# Patient Record
Sex: FEMALE | Race: WHITE | Employment: UNEMPLOYED | URBAN - METROPOLITAN AREA
[De-identification: names, ages, dates, MRNs, and addresses within clinical notes are randomized per-mention and may not be internally consistent; named-entity substitution may affect disease eponyms.]

---

## 2019-03-25 ENCOUNTER — HOSPITAL ENCOUNTER (OUTPATIENT)
Dept: SURGERY | Age: 49
Discharge: HOME | DRG: 455 | End: 2019-03-25
Payer: COMMERCIAL

## 2019-03-25 VITALS
TEMPERATURE: 97.9 F | OXYGEN SATURATION: 95 % | BODY MASS INDEX: 27.67 KG/M2 | RESPIRATION RATE: 16 BRPM | WEIGHT: 162.06 LBS | HEART RATE: 84 BPM | DIASTOLIC BLOOD PRESSURE: 73 MMHG | SYSTOLIC BLOOD PRESSURE: 136 MMHG | HEIGHT: 64 IN

## 2019-03-25 LAB
ABO + RH BLD: NORMAL
ANION GAP SERPL CALC-SCNC: 5 MMOL/L (ref 7–16)
APPEARANCE UR: ABNORMAL
BACTERIA SPEC CULT: NORMAL
BACTERIA URNS QL MICRO: ABNORMAL /HPF
BASOPHILS # BLD: 0.1 K/UL (ref 0–0.2)
BASOPHILS NFR BLD: 0 % (ref 0–2)
BILIRUB UR QL: NEGATIVE
BLOOD BANK CMNT PATIENT-IMP: NORMAL
BLOOD GROUP ANTIBODIES SERPL: NORMAL
BUN SERPL-MCNC: 14 MG/DL (ref 6–23)
CALCIUM SERPL-MCNC: 9.2 MG/DL (ref 8.3–10.4)
CASTS URNS QL MICRO: ABNORMAL /LPF
CHLORIDE SERPL-SCNC: 104 MMOL/L (ref 98–107)
CO2 SERPL-SCNC: 30 MMOL/L (ref 21–32)
COLOR UR: YELLOW
CREAT SERPL-MCNC: 0.79 MG/DL (ref 0.6–1)
DIFFERENTIAL METHOD BLD: ABNORMAL
EOSINOPHIL # BLD: 0.2 K/UL (ref 0–0.8)
EOSINOPHIL NFR BLD: 1 % (ref 0.5–7.8)
EPI CELLS #/AREA URNS HPF: ABNORMAL /HPF
ERYTHROCYTE [DISTWIDTH] IN BLOOD BY AUTOMATED COUNT: 12.8 % (ref 11.9–14.6)
GLUCOSE SERPL-MCNC: 98 MG/DL (ref 65–100)
GLUCOSE UR STRIP.AUTO-MCNC: NEGATIVE MG/DL
HCT VFR BLD AUTO: 37.5 % (ref 35.8–46.3)
HGB BLD-MCNC: 12.2 G/DL (ref 11.7–15.4)
HGB UR QL STRIP: ABNORMAL
IMM GRANULOCYTES # BLD AUTO: 0.1 K/UL (ref 0–0.5)
IMM GRANULOCYTES NFR BLD AUTO: 0 % (ref 0–5)
KETONES UR QL STRIP.AUTO: NEGATIVE MG/DL
LEUKOCYTE ESTERASE UR QL STRIP.AUTO: ABNORMAL
LYMPHOCYTES # BLD: 2.7 K/UL (ref 0.5–4.6)
LYMPHOCYTES NFR BLD: 19 % (ref 13–44)
MCH RBC QN AUTO: 30.5 PG (ref 26.1–32.9)
MCHC RBC AUTO-ENTMCNC: 32.5 G/DL (ref 31.4–35)
MCV RBC AUTO: 93.8 FL (ref 79.6–97.8)
MONOCYTES # BLD: 0.8 K/UL (ref 0.1–1.3)
MONOCYTES NFR BLD: 5 % (ref 4–12)
NEUTS SEG # BLD: 10.5 K/UL (ref 1.7–8.2)
NEUTS SEG NFR BLD: 74 % (ref 43–78)
NITRITE UR QL STRIP.AUTO: POSITIVE
NRBC # BLD: 0 K/UL (ref 0–0.2)
PH UR STRIP: 8 [PH] (ref 5–9)
PLATELET # BLD AUTO: 279 K/UL (ref 150–450)
PMV BLD AUTO: 10.3 FL (ref 9.4–12.3)
POTASSIUM SERPL-SCNC: 4.2 MMOL/L (ref 3.5–5.1)
PROT UR STRIP-MCNC: ABNORMAL MG/DL
RBC # BLD AUTO: 4 M/UL (ref 4.05–5.2)
RBC #/AREA URNS HPF: ABNORMAL /HPF
SERVICE CMNT-IMP: NORMAL
SODIUM SERPL-SCNC: 139 MMOL/L (ref 136–145)
SP GR UR REFRACTOMETRY: 1.02 (ref 1–1.02)
SPECIMEN EXP DATE BLD: NORMAL
UROBILINOGEN UR QL STRIP.AUTO: 0.2 EU/DL (ref 0.2–1)
WBC # BLD AUTO: 14.2 K/UL (ref 4.3–11.1)
WBC URNS QL MICRO: >100 /HPF

## 2019-03-25 PROCEDURE — 81001 URINALYSIS AUTO W/SCOPE: CPT

## 2019-03-25 PROCEDURE — 77030027138 HC INCENT SPIROMETER -A

## 2019-03-25 PROCEDURE — 86900 BLOOD TYPING SEROLOGIC ABO: CPT

## 2019-03-25 PROCEDURE — 80048 BASIC METABOLIC PNL TOTAL CA: CPT

## 2019-03-25 PROCEDURE — 85025 COMPLETE CBC W/AUTO DIFF WBC: CPT

## 2019-03-25 PROCEDURE — 87641 MR-STAPH DNA AMP PROBE: CPT

## 2019-03-25 RX ORDER — LISINOPRIL 10 MG/1
10 TABLET ORAL DAILY
COMMUNITY

## 2019-03-25 RX ORDER — VITAMIN E 268 MG
400 CAPSULE ORAL DAILY
COMMUNITY

## 2019-03-25 RX ORDER — OXYBUTYNIN CHLORIDE 5 MG/1
5 TABLET ORAL 3 TIMES DAILY
COMMUNITY

## 2019-03-25 RX ORDER — OXYCODONE AND ACETAMINOPHEN 10; 325 MG/1; MG/1
TABLET ORAL
COMMUNITY
End: 2019-03-29

## 2019-03-25 RX ORDER — GABAPENTIN 600 MG/1
1200 TABLET ORAL 3 TIMES DAILY
COMMUNITY

## 2019-03-25 RX ORDER — ESTRADIOL 2 MG/1
2 TABLET ORAL DAILY
COMMUNITY

## 2019-03-25 RX ORDER — TEMAZEPAM 15 MG/1
15 CAPSULE ORAL
COMMUNITY

## 2019-03-25 RX ORDER — CITALOPRAM 20 MG/1
20 TABLET, FILM COATED ORAL 2 TIMES DAILY
COMMUNITY

## 2019-03-25 NOTE — PERIOP NOTES
Recent Results (from the past 24 hour(s)) TYPE & SCREEN Collection Time: 03/25/19  8:28 AM  
Result Value Ref Range Crossmatch Expiration 03/28/2019 ABO/Rh(D) A POSITIVE Antibody screen NEG Comment Specimen processed by automated Blood Bank analyzer CBC WITH AUTOMATED DIFF Collection Time: 03/25/19  8:29 AM  
Result Value Ref Range WBC 14.2 (H) 4.3 - 11.1 K/uL  
 RBC 4.00 (L) 4.05 - 5.2 M/uL  
 HGB 12.2 11.7 - 15.4 g/dL HCT 37.5 35.8 - 46.3 % MCV 93.8 79.6 - 97.8 FL  
 MCH 30.5 26.1 - 32.9 PG  
 MCHC 32.5 31.4 - 35.0 g/dL  
 RDW 12.8 11.9 - 14.6 % PLATELET 322 085 - 801 K/uL MPV 10.3 9.4 - 12.3 FL ABSOLUTE NRBC 0.00 0.0 - 0.2 K/uL  
 DF AUTOMATED NEUTROPHILS 74 43 - 78 % LYMPHOCYTES 19 13 - 44 % MONOCYTES 5 4.0 - 12.0 % EOSINOPHILS 1 0.5 - 7.8 % BASOPHILS 0 0.0 - 2.0 % IMMATURE GRANULOCYTES 0 0.0 - 5.0 %  
 ABS. NEUTROPHILS 10.5 (H) 1.7 - 8.2 K/UL  
 ABS. LYMPHOCYTES 2.7 0.5 - 4.6 K/UL  
 ABS. MONOCYTES 0.8 0.1 - 1.3 K/UL  
 ABS. EOSINOPHILS 0.2 0.0 - 0.8 K/UL  
 ABS. BASOPHILS 0.1 0.0 - 0.2 K/UL  
 ABS. IMM. GRANS. 0.1 0.0 - 0.5 K/UL METABOLIC PANEL, BASIC Collection Time: 03/25/19  8:29 AM  
Result Value Ref Range Sodium 139 136 - 145 mmol/L Potassium 4.2 3.5 - 5.1 mmol/L Chloride 104 98 - 107 mmol/L  
 CO2 30 21 - 32 mmol/L Anion gap 5 (L) 7 - 16 mmol/L Glucose 98 65 - 100 mg/dL BUN 14 6 - 23 MG/DL Creatinine 0.79 0.6 - 1.0 MG/DL  
 GFR est AA >60 >60 ml/min/1.73m2 GFR est non-AA >60 >60 ml/min/1.73m2 Calcium 9.2 8.3 - 10.4 MG/DL URINALYSIS W/ RFLX MICROSCOPIC Collection Time: 03/25/19  8:29 AM  
Result Value Ref Range Color YELLOW Appearance TURBID Specific gravity 1.018 1.001 - 1.023    
 pH (UA) 8.0 5.0 - 9.0 Protein TRACE (A) NEG mg/dL Glucose NEGATIVE  mg/dL Ketone NEGATIVE  NEG mg/dL Bilirubin NEGATIVE  NEG Blood SMALL (A) NEG Urobilinogen 0.2 0.2 - 1.0 EU/dL Nitrites POSITIVE (A) NEG Leukocyte Esterase LARGE (A) NEG    
 WBC >100 (H) 0 /hpf  
 RBC 3-5 0 /hpf Epithelial cells 3-5 0 /hpf Bacteria 2+ (H) 0 /hpf Casts 10-20 0 /lpf MSSA/MRSA SC BY PCR, NASAL SWAB Collection Time: 03/25/19  9:02 AM  
Result Value Ref Range Special Requests: NO SPECIAL REQUESTS Culture result:     
  SA target not detected. A MRSA NEGATIVE, SA NEGATIVE test result does not preclude MRSA or SA nasal colonization.

## 2019-03-25 NOTE — PERIOP NOTES
Patient verified name&  . Order to obtain consent not found in EHR & patient verified  case posting. Type 3 surgery,  assessment complete. Orders not yet received. Labs per surgeon: per protocol- cbc with diff, bmp, type and screen, urinalysis, mrsa/mssa Labs per anesthesia protocol:none Patient answered medical/surgical history questions at their best of ability. All prior to admission medications documented in [facility]. Patient instructed to take the following medications the day of surgery according to anesthesia guidelines with a small sip of water: celexa, gabapentin, levothyroxine, oxybutinin, percocet if needed . Hold all vitamins 7 days prior to surgery and NSAIDS 5 days prior to surgery. Medications to be held none Patient instructed on the following: 
Arrive at main Entrance, time of arrival to be called the day before by 1700. NPO after midnight including gum, mints, and ice chips. Responsible adult must drive patient to the hospital, stay during surgery, and patient requires supervision 24 hours after anesthesia Use hibiclens in shower the night before surgery and on the morning of surgery. Leave all valuables (money and jewelry) at home but bring insurance card and ID on       DOS. Do not wear make-up, nail polish, lotions, cologne, perfumes, powders, or oil on skin. Patient teach back successful and patient demonstrates knowledge of instruction.

## 2019-03-26 ENCOUNTER — ANESTHESIA EVENT (OUTPATIENT)
Dept: SURGERY | Age: 49
DRG: 455 | End: 2019-03-26
Payer: COMMERCIAL

## 2019-03-26 RX ORDER — CEFAZOLIN SODIUM/WATER 2 G/20 ML
2 SYRINGE (ML) INTRAVENOUS ONCE
Status: CANCELLED | OUTPATIENT
Start: 2019-03-26 | End: 2019-03-26

## 2019-03-26 NOTE — H&P
Chief complaint: Back and buttock pain with activities. History of present illness: This is a very pleasant 51year old patient who presents with a several year history of low back pain with constant and progressive radiation to the buttocks and lower extremities, primarily on the right side. The onset of the symptoms was rather insidious. The patient describes the quality of the pain as a deep ache. The patient has noticed a progressive decrease in her ability to walk or stand for any extended period of time. Her standing tolerance is about 35 minutes and walking distance limited to 1 block. She does require a cane for ambulation. Her walking and standing pain is usually relieved with sitting. She has noted that pushing a cart in the store seems to help. She denies any change in bowel or bladder function since the onset of the symptoms. The patient was originally seen here back in 2013 when she was felt to have discogenic back pain. I recommended against surgery. However, she saw Dr. Rene Patricia who ended up doing an L4-S1 laminectomy and fusion. Apparently, she did not do well with that surgery. Ultimately, she was found to have significant adjacent level breakdown and in 2017 she underwent a revision laminectomy at L3-L4 cephalad to her fusion. The patient states that that helped her symptoms very transiently. However, now they are worse than ever. It interferes with just about all activities of daily living. She cannot sit up straight or stand up straight because of the right-sided flank, buttock, and thigh and groin pain. She has had a workup of the right hip by Dr. Cosmo Rothman including a hip MRI that failed to show significant pathology. It was therefore suggested that her problems were stemming from her low back and a lumbar MRI confirmed that PMHx/PSHx/Social History/Medications/Allergies/ROS are listed and have been reviewed. Review of systems was noted.  Pertinent positives and negatives were discussed with the patient particularly those that related to musculoskeletal complaints. Nonorthopedic complaints were directed to the primary care physician. Medications: Citalopram Hydrobromide;Estradiol; Levothyroxine Sodium; Lisinopril;Multi Vitamin; Oxybutynin Chloride; OxyCODONE HCl;Phentermine HCl;Temazepam;ValACYclovir HCl 
????? Allergies: NKDA 
????? 
 
Physical Exam: This is a well developed adult female in moderate distress from pain. Mood and affect are appropriate. Oriented to person, place, and time. Respirations are unlabored and there is no evidence of cyanosis Chest is clear to auscultation bilaterally. Heart is regular rate and rhythm. Abdomen soft nontender. No lymphadenopathy. No thyromegaly. Conjunctiva clear. EOMI. Inspection of the back reveals no evidence of rash, such as zoster. Examination of the lumbar spine reveals a relative hypolordosis, and no evidence of significant saggital plane deformity. There is exacerbation of symptoms with lumbar extension. There is not significant tenderness to palpation along the spinous processes or paraspinal musculature. The patient ambulates with a slightly crouched posture. Straight leg testing is negative. There is minimal hip irritability with internal or external rotation bilaterally. Sensory testing reveals intact sensation to light touch and pin prick in the distribution of the L3-S1 dermatomes bilaterally, though there is subjective tingling over the right buttock and anterior thigh. Reflexes Right Left Quadriceps (L4) 2 2 Achilles (S1) 2 2 Ankle jerk is negative for clonus Strength testing in the lower extremity reveals the following based on the 5 point grading scale: 
 
 HF (L2) H Ab (L5) KE (L3/4) ADF (L4) EHL (L5) A Ev (S1) APF (S1) Right 4- 5 4- 5 5 5 5 Left 5 5 5 5 5 5 5 The feet are warm with good capillary refill and palpable pedal pulses. Radiographic Studies: 
 
X-rays including AP and lateral views of the lumbar spine were reviewed: ? ???? There is noted to be a Grade 1 degenerative spondylolisthesis at L3-L4 cephalad to a solid-appearing L4-S1 fusion with instrumentation. Bone quality appears normal. 
 
Radiographic impression: Lumbar spondylolisthesis cephalad to a solid fusion MRI Lumbar spine, report and images independently reviewed: Postoperative changes are noted from L3-S1. A fusion has been done from L4-S1 and that appears appropriate. At L3-L4, the patient has had a laminectomy. However, there is severe recurrent stenosis and a disc herniation on both sides causing foraminal and descending root impingement. Assessment/Plan: This patients clinical history and physical exam is consistent with neurogenic claudication which is likely due to lumbar spondylolisthesis and stenosis cephalad to the previous fusion and in the area of the previous laminectomy. The patient is quite debilitated with an unsteady gait using a cane and very limited tolerance for standing or walking. She has tried many conservative efforts over the years and has not gotten better. At this point, her MRI correlates well with her symptoms and she would like to consider surgical intervention. ????? We discussed surgical options including a direct lateral fusion and posterior laminectomy and posterior  fusion. We discussed the details of the the surgery including a small lateral incision over the flank followed by dissection to the area of disc collapse and deformity. This would be done using neural monitoring and a series of minimally invasive retractors. Once identifying the disk space radiographically, the retractors would be docked and the disk would be excised. The disk space would be distracted as much as possible and measured for the appropriate sized peak cage.    This would be packed with allograft and likely bone marrow aspirate. Lateral screws may be applied for supplemental stability. The wound would then be closed with suture and covered with sterile dressings. If any additional stabilization needs to be performed posteriorly, a second incision would be made over the lumbar spine posteriorly. Any remaining nerve root or spinal cord impingement would be decompressed and additional hardware placed posteriorly as deemed necessary for stabilization. She would expect to stay in the hospital 1-3 days or until she can get about safely with minimal assistance. A short stay in a rehabilitation facility could also be considered depending on how quickly she recovers. Follow-up would be scheduled for 2 weeks and she  would have restrictions including no driving, and no lifting greater than 15 lbs until follow up with me. She was encouraged to walk as much as possible before and after the operation to facilitate an expeditious recovery. We also discussed the potential risks of the surgery including, but not limited to infection, spinal fluid leak and potential headaches requiring them to remain supine or have a lumbar drain inserted post-operatively; injury to the cauda equina or peripheral nerve root resulting in paralysis, bowel or bladder injury or dysfunction, or loss of use of an extremity; persistent back or leg symptoms or pain at the bone graft site; recurrence of stenosis or the development of instability, or failure of the hardware or fusion to heal possibly needing additional surgery;  blood loss requiring transfusion; and the risks of anesthesia including, but not limited heart attack, stroke, and blood clot, and death. Also there is the risk of visceral, vascular, renal, or other intra-abdominal injury. She also understands that she will likely have groin pain and anterior thigh pain due to dissection through the iliopsoas.  The patient voiced a direct lateral n understanding of these issues and would like to proceed with surgical scheduling. The procedure we will plan for is a minimally invasive anterior interbody fusion at L3-L4 with interbody cage, allograft, posterior laminectomy and fusion L3-L4, instrumentation L3-S1, bone marrow aspirate, allograft. We will need the [employer] representative to identify the prior instrumentation so that we can have the appropriate removal set available.  
 
Electronically Signed By Simi Marino MD

## 2019-03-27 ENCOUNTER — APPOINTMENT (OUTPATIENT)
Dept: GENERAL RADIOLOGY | Age: 49
DRG: 455 | End: 2019-03-27
Attending: ORTHOPAEDIC SURGERY
Payer: COMMERCIAL

## 2019-03-27 ENCOUNTER — ANESTHESIA (OUTPATIENT)
Dept: SURGERY | Age: 49
DRG: 455 | End: 2019-03-27
Payer: COMMERCIAL

## 2019-03-27 ENCOUNTER — HOSPITAL ENCOUNTER (INPATIENT)
Age: 49
LOS: 2 days | Discharge: HOME | DRG: 455 | End: 2019-03-29
Attending: ORTHOPAEDIC SURGERY | Admitting: ORTHOPAEDIC SURGERY
Payer: COMMERCIAL

## 2019-03-27 DIAGNOSIS — M48.062 LUMBAR STENOSIS WITH NEUROGENIC CLAUDICATION: Primary | ICD-10-CM

## 2019-03-27 PROCEDURE — 77030025006 HC BUR STRY -C: Performed by: ORTHOPAEDIC SURGERY

## 2019-03-27 PROCEDURE — 74011250636 HC RX REV CODE- 250/636: Performed by: ANESTHESIOLOGY

## 2019-03-27 PROCEDURE — 77030020268 HC MISC GENERAL SUPPLY: Performed by: ORTHOPAEDIC SURGERY

## 2019-03-27 PROCEDURE — C1713 ANCHOR/SCREW BN/BN,TIS/BN: HCPCS | Performed by: ORTHOPAEDIC SURGERY

## 2019-03-27 PROCEDURE — 77030040356 HC CORD BPLR FRCP COVD -A: Performed by: ORTHOPAEDIC SURGERY

## 2019-03-27 PROCEDURE — 65270000029 HC RM PRIVATE

## 2019-03-27 PROCEDURE — 74011250636 HC RX REV CODE- 250/636: Performed by: ORTHOPAEDIC SURGERY

## 2019-03-27 PROCEDURE — 0SG00A0 FUSION OF LUMBAR VERTEBRAL JOINT WITH INTERBODY FUSION DEVICE, ANTERIOR APPROACH, ANTERIOR COLUMN, OPEN APPROACH: ICD-10-PCS | Performed by: ORTHOPAEDIC SURGERY

## 2019-03-27 PROCEDURE — 77030034760 HC NDL BN MAR ASPIR JAMSH STRY -B: Performed by: ORTHOPAEDIC SURGERY

## 2019-03-27 PROCEDURE — 77030025623 HC BUR RND PRECIS STRY -D: Performed by: ORTHOPAEDIC SURGERY

## 2019-03-27 PROCEDURE — 77030014647 HC SEAL FBRN TISSL BAXT -D: Performed by: ORTHOPAEDIC SURGERY

## 2019-03-27 PROCEDURE — 77030039425 HC BLD LARYNG TRULITE DISP TELE -A: Performed by: ANESTHESIOLOGY

## 2019-03-27 PROCEDURE — 77030020255 HC SOL INJ LR 1000ML BG

## 2019-03-27 PROCEDURE — 72100 X-RAY EXAM L-S SPINE 2/3 VWS: CPT

## 2019-03-27 PROCEDURE — 07DR3ZZ EXTRACTION OF ILIAC BONE MARROW, PERCUTANEOUS APPROACH: ICD-10-PCS | Performed by: ORTHOPAEDIC SURGERY

## 2019-03-27 PROCEDURE — 0SG0071 FUSION OF LUMBAR VERTEBRAL JOINT WITH AUTOLOGOUS TISSUE SUBSTITUTE, POSTERIOR APPROACH, POSTERIOR COLUMN, OPEN APPROACH: ICD-10-PCS | Performed by: ORTHOPAEDIC SURGERY

## 2019-03-27 PROCEDURE — 76010000175 HC OR TIME 4 TO 4.5 HR INTENSV-TIER 1: Performed by: ORTHOPAEDIC SURGERY

## 2019-03-27 PROCEDURE — 77030037088 HC TUBE ENDOTRACH ORAL NSL COVD-A: Performed by: ANESTHESIOLOGY

## 2019-03-27 PROCEDURE — 77030038851 HC CGE SPN AVS STRY -I2: Performed by: ORTHOPAEDIC SURGERY

## 2019-03-27 PROCEDURE — 76060000039 HC ANESTHESIA 4 TO 4.5 HR: Performed by: ORTHOPAEDIC SURGERY

## 2019-03-27 PROCEDURE — 77030018673: Performed by: ORTHOPAEDIC SURGERY

## 2019-03-27 PROCEDURE — 74011000250 HC RX REV CODE- 250

## 2019-03-27 PROCEDURE — 77030019557 HC ELECTRD VES SEAL MEDT -F: Performed by: ORTHOPAEDIC SURGERY

## 2019-03-27 PROCEDURE — 74011250636 HC RX REV CODE- 250/636

## 2019-03-27 PROCEDURE — 77030030163 HC BN WAX J&J -A: Performed by: ORTHOPAEDIC SURGERY

## 2019-03-27 PROCEDURE — 77030012406 HC DRN WND PENRS BARD -A: Performed by: ORTHOPAEDIC SURGERY

## 2019-03-27 PROCEDURE — 77030037709: Performed by: ORTHOPAEDIC SURGERY

## 2019-03-27 PROCEDURE — 77030018836 HC SOL IRR NACL ICUM -A: Performed by: ORTHOPAEDIC SURGERY

## 2019-03-27 PROCEDURE — 77030032490 HC SLV COMPR SCD KNE COVD -B: Performed by: ORTHOPAEDIC SURGERY

## 2019-03-27 PROCEDURE — 77030034850: Performed by: ORTHOPAEDIC SURGERY

## 2019-03-27 PROCEDURE — 0ST20ZZ RESECTION OF LUMBAR VERTEBRAL DISC, OPEN APPROACH: ICD-10-PCS | Performed by: ORTHOPAEDIC SURGERY

## 2019-03-27 PROCEDURE — 77030020782 HC GWN BAIR PAWS FLX 3M -B: Performed by: ANESTHESIOLOGY

## 2019-03-27 PROCEDURE — 77030019940 HC BLNKT HYPOTHRM STRY -B: Performed by: ANESTHESIOLOGY

## 2019-03-27 PROCEDURE — 74011000250 HC RX REV CODE- 250: Performed by: ORTHOPAEDIC SURGERY

## 2019-03-27 PROCEDURE — 74011250637 HC RX REV CODE- 250/637: Performed by: ORTHOPAEDIC SURGERY

## 2019-03-27 PROCEDURE — 77030012893

## 2019-03-27 PROCEDURE — 77030031139 HC SUT VCRL2 J&J -A: Performed by: ORTHOPAEDIC SURGERY

## 2019-03-27 PROCEDURE — 76210000016 HC OR PH I REC 1 TO 1.5 HR: Performed by: ORTHOPAEDIC SURGERY

## 2019-03-27 PROCEDURE — 74011250637 HC RX REV CODE- 250/637: Performed by: ANESTHESIOLOGY

## 2019-03-27 DEVICE — BLOCKER
Type: IMPLANTABLE DEVICE | Site: SPINE LUMBAR | Status: FUNCTIONAL
Brand: XIA 3

## 2019-03-27 DEVICE — PEEK SPACER
Type: IMPLANTABLE DEVICE | Site: SPINE LUMBAR | Status: FUNCTIONAL
Brand: AVS ARIA

## 2019-03-27 DEVICE — TI ALLOY RAD ROD
Type: IMPLANTABLE DEVICE | Site: SPINE LUMBAR | Status: FUNCTIONAL
Brand: XIA 3

## 2019-03-27 DEVICE — POLYAXIAL SCREW
Type: IMPLANTABLE DEVICE | Site: SPINE LUMBAR | Status: FUNCTIONAL
Brand: XIA 3 SYSTEM - SERRATO

## 2019-03-27 DEVICE — GRAFT BNE SUB 10CC 2-4MM GROWTH FACT ALLGRFT OSTEOAMP: Type: IMPLANTABLE DEVICE | Site: SPINE LUMBAR | Status: FUNCTIONAL

## 2019-03-27 DEVICE — BIO DBM PLUS PUTTY (WITH CANCELLOUS)
Type: IMPLANTABLE DEVICE | Site: SPINE LUMBAR | Status: FUNCTIONAL
Brand: BIO DBM

## 2019-03-27 RX ORDER — DIPHENHYDRAMINE HYDROCHLORIDE 50 MG/ML
12.5 INJECTION, SOLUTION INTRAMUSCULAR; INTRAVENOUS
Status: DISCONTINUED | OUTPATIENT
Start: 2019-03-27 | End: 2019-03-27 | Stop reason: HOSPADM

## 2019-03-27 RX ORDER — KETAMINE HYDROCHLORIDE 100 MG/ML
INJECTION, SOLUTION INTRAMUSCULAR; INTRAVENOUS AS NEEDED
Status: DISCONTINUED | OUTPATIENT
Start: 2019-03-27 | End: 2019-03-27 | Stop reason: HOSPADM

## 2019-03-27 RX ORDER — HYDROMORPHONE HYDROCHLORIDE 2 MG/ML
0.5 INJECTION, SOLUTION INTRAMUSCULAR; INTRAVENOUS; SUBCUTANEOUS
Status: COMPLETED | OUTPATIENT
Start: 2019-03-27 | End: 2019-03-27

## 2019-03-27 RX ORDER — SODIUM CHLORIDE, SODIUM LACTATE, POTASSIUM CHLORIDE, CALCIUM CHLORIDE 600; 310; 30; 20 MG/100ML; MG/100ML; MG/100ML; MG/100ML
75 INJECTION, SOLUTION INTRAVENOUS CONTINUOUS
Status: DISCONTINUED | OUTPATIENT
Start: 2019-03-27 | End: 2019-03-27 | Stop reason: SDUPTHER

## 2019-03-27 RX ORDER — GABAPENTIN 300 MG/1
600 CAPSULE ORAL ONCE
Status: DISCONTINUED | OUTPATIENT
Start: 2019-03-27 | End: 2019-03-27 | Stop reason: SDUPTHER

## 2019-03-27 RX ORDER — CEFAZOLIN SODIUM/WATER 2 G/20 ML
2 SYRINGE (ML) INTRAVENOUS EVERY 8 HOURS
Status: COMPLETED | OUTPATIENT
Start: 2019-03-27 | End: 2019-03-28

## 2019-03-27 RX ORDER — SUCCINYLCHOLINE CHLORIDE 20 MG/ML
INJECTION INTRAMUSCULAR; INTRAVENOUS AS NEEDED
Status: DISCONTINUED | OUTPATIENT
Start: 2019-03-27 | End: 2019-03-27 | Stop reason: HOSPADM

## 2019-03-27 RX ORDER — FLUMAZENIL 0.1 MG/ML
0.2 INJECTION INTRAVENOUS AS NEEDED
Status: DISCONTINUED | OUTPATIENT
Start: 2019-03-27 | End: 2019-03-27 | Stop reason: SDUPTHER

## 2019-03-27 RX ORDER — FAMOTIDINE 20 MG/1
20 TABLET, FILM COATED ORAL EVERY 12 HOURS
Status: DISCONTINUED | OUTPATIENT
Start: 2019-03-27 | End: 2019-03-29 | Stop reason: HOSPADM

## 2019-03-27 RX ORDER — SODIUM CHLORIDE, SODIUM LACTATE, POTASSIUM CHLORIDE, CALCIUM CHLORIDE 600; 310; 30; 20 MG/100ML; MG/100ML; MG/100ML; MG/100ML
75 INJECTION, SOLUTION INTRAVENOUS CONTINUOUS
Status: DISPENSED | OUTPATIENT
Start: 2019-03-27 | End: 2019-03-28

## 2019-03-27 RX ORDER — PROPOFOL 10 MG/ML
INJECTION, EMULSION INTRAVENOUS AS NEEDED
Status: DISCONTINUED | OUTPATIENT
Start: 2019-03-27 | End: 2019-03-27 | Stop reason: HOSPADM

## 2019-03-27 RX ORDER — NALOXONE HYDROCHLORIDE 0.4 MG/ML
0.4 INJECTION, SOLUTION INTRAMUSCULAR; INTRAVENOUS; SUBCUTANEOUS
Status: DISCONTINUED | OUTPATIENT
Start: 2019-03-27 | End: 2019-03-29 | Stop reason: HOSPADM

## 2019-03-27 RX ORDER — NALOXONE HYDROCHLORIDE 0.4 MG/ML
0.1 INJECTION, SOLUTION INTRAMUSCULAR; INTRAVENOUS; SUBCUTANEOUS
Status: DISCONTINUED | OUTPATIENT
Start: 2019-03-27 | End: 2019-03-27 | Stop reason: HOSPADM

## 2019-03-27 RX ORDER — DIPHENHYDRAMINE HYDROCHLORIDE 50 MG/ML
12.5 INJECTION, SOLUTION INTRAMUSCULAR; INTRAVENOUS
Status: DISCONTINUED | OUTPATIENT
Start: 2019-03-27 | End: 2019-03-27 | Stop reason: SDUPTHER

## 2019-03-27 RX ORDER — DOCUSATE SODIUM 100 MG/1
100 CAPSULE, LIQUID FILLED ORAL 2 TIMES DAILY
Status: DISCONTINUED | OUTPATIENT
Start: 2019-03-27 | End: 2019-03-29 | Stop reason: HOSPADM

## 2019-03-27 RX ORDER — SODIUM CHLORIDE, SODIUM LACTATE, POTASSIUM CHLORIDE, CALCIUM CHLORIDE 600; 310; 30; 20 MG/100ML; MG/100ML; MG/100ML; MG/100ML
75 INJECTION, SOLUTION INTRAVENOUS CONTINUOUS
Status: DISCONTINUED | OUTPATIENT
Start: 2019-03-27 | End: 2019-03-27 | Stop reason: HOSPADM

## 2019-03-27 RX ORDER — LIDOCAINE HYDROCHLORIDE 10 MG/ML
0.1 INJECTION INFILTRATION; PERINEURAL AS NEEDED
Status: DISCONTINUED | OUTPATIENT
Start: 2019-03-27 | End: 2019-03-27 | Stop reason: HOSPADM

## 2019-03-27 RX ORDER — LISINOPRIL 5 MG/1
10 TABLET ORAL DAILY
Status: DISCONTINUED | OUTPATIENT
Start: 2019-03-28 | End: 2019-03-29 | Stop reason: HOSPADM

## 2019-03-27 RX ORDER — SODIUM CHLORIDE 0.9 % (FLUSH) 0.9 %
5-40 SYRINGE (ML) INJECTION AS NEEDED
Status: DISCONTINUED | OUTPATIENT
Start: 2019-03-27 | End: 2019-03-29 | Stop reason: HOSPADM

## 2019-03-27 RX ORDER — OXYCODONE HYDROCHLORIDE 5 MG/1
10 TABLET ORAL
Status: DISCONTINUED | OUTPATIENT
Start: 2019-03-27 | End: 2019-03-27 | Stop reason: SDUPTHER

## 2019-03-27 RX ORDER — HYDROMORPHONE HYDROCHLORIDE 2 MG/ML
0.5 INJECTION, SOLUTION INTRAMUSCULAR; INTRAVENOUS; SUBCUTANEOUS
Status: DISCONTINUED | OUTPATIENT
Start: 2019-03-27 | End: 2019-03-27 | Stop reason: HOSPADM

## 2019-03-27 RX ORDER — ROCURONIUM BROMIDE 10 MG/ML
INJECTION, SOLUTION INTRAVENOUS AS NEEDED
Status: DISCONTINUED | OUTPATIENT
Start: 2019-03-27 | End: 2019-03-27 | Stop reason: HOSPADM

## 2019-03-27 RX ORDER — ONDANSETRON 2 MG/ML
4 INJECTION INTRAMUSCULAR; INTRAVENOUS
Status: DISCONTINUED | OUTPATIENT
Start: 2019-03-27 | End: 2019-03-29 | Stop reason: HOSPADM

## 2019-03-27 RX ORDER — DEXAMETHASONE SODIUM PHOSPHATE 4 MG/ML
INJECTION, SOLUTION INTRA-ARTICULAR; INTRALESIONAL; INTRAMUSCULAR; INTRAVENOUS; SOFT TISSUE AS NEEDED
Status: DISCONTINUED | OUTPATIENT
Start: 2019-03-27 | End: 2019-03-27 | Stop reason: HOSPADM

## 2019-03-27 RX ORDER — OXYCODONE HYDROCHLORIDE 5 MG/1
5 TABLET ORAL
Status: DISCONTINUED | OUTPATIENT
Start: 2019-03-27 | End: 2019-03-27 | Stop reason: HOSPADM

## 2019-03-27 RX ORDER — ACETAMINOPHEN 10 MG/ML
1000 INJECTION, SOLUTION INTRAVENOUS ONCE
Status: COMPLETED | OUTPATIENT
Start: 2019-03-27 | End: 2019-03-27

## 2019-03-27 RX ORDER — OXYCODONE HYDROCHLORIDE 5 MG/1
5 TABLET ORAL
Status: DISCONTINUED | OUTPATIENT
Start: 2019-03-27 | End: 2019-03-27 | Stop reason: SDUPTHER

## 2019-03-27 RX ORDER — ESTRADIOL 1 MG/1
2 TABLET ORAL DAILY
Status: DISCONTINUED | OUTPATIENT
Start: 2019-03-28 | End: 2019-03-29 | Stop reason: HOSPADM

## 2019-03-27 RX ORDER — NALOXONE HYDROCHLORIDE 0.4 MG/ML
0.1 INJECTION, SOLUTION INTRAMUSCULAR; INTRAVENOUS; SUBCUTANEOUS
Status: DISCONTINUED | OUTPATIENT
Start: 2019-03-27 | End: 2019-03-27 | Stop reason: SDUPTHER

## 2019-03-27 RX ORDER — LIDOCAINE HYDROCHLORIDE 20 MG/ML
INJECTION, SOLUTION EPIDURAL; INFILTRATION; INTRACAUDAL; PERINEURAL AS NEEDED
Status: DISCONTINUED | OUTPATIENT
Start: 2019-03-27 | End: 2019-03-27 | Stop reason: HOSPADM

## 2019-03-27 RX ORDER — ONDANSETRON 2 MG/ML
INJECTION INTRAMUSCULAR; INTRAVENOUS AS NEEDED
Status: DISCONTINUED | OUTPATIENT
Start: 2019-03-27 | End: 2019-03-27 | Stop reason: HOSPADM

## 2019-03-27 RX ORDER — VANCOMYCIN HYDROCHLORIDE 1 G/20ML
INJECTION, POWDER, LYOPHILIZED, FOR SOLUTION INTRAVENOUS AS NEEDED
Status: DISCONTINUED | OUTPATIENT
Start: 2019-03-27 | End: 2019-03-27 | Stop reason: HOSPADM

## 2019-03-27 RX ORDER — MORPHINE SULFATE 2 MG/ML
2 INJECTION, SOLUTION INTRAMUSCULAR; INTRAVENOUS
Status: DISCONTINUED | OUTPATIENT
Start: 2019-03-27 | End: 2019-03-29 | Stop reason: HOSPADM

## 2019-03-27 RX ORDER — MIDAZOLAM HYDROCHLORIDE 1 MG/ML
2 INJECTION, SOLUTION INTRAMUSCULAR; INTRAVENOUS
Status: DISCONTINUED | OUTPATIENT
Start: 2019-03-27 | End: 2019-03-27 | Stop reason: HOSPADM

## 2019-03-27 RX ORDER — HYDROMORPHONE HYDROCHLORIDE 2 MG/ML
0.5 INJECTION, SOLUTION INTRAMUSCULAR; INTRAVENOUS; SUBCUTANEOUS
Status: DISCONTINUED | OUTPATIENT
Start: 2019-03-27 | End: 2019-03-27 | Stop reason: SDUPTHER

## 2019-03-27 RX ORDER — TEMAZEPAM 15 MG/1
15 CAPSULE ORAL
Status: DISCONTINUED | OUTPATIENT
Start: 2019-03-27 | End: 2019-03-29 | Stop reason: HOSPADM

## 2019-03-27 RX ORDER — FENTANYL CITRATE 50 UG/ML
INJECTION, SOLUTION INTRAMUSCULAR; INTRAVENOUS AS NEEDED
Status: DISCONTINUED | OUTPATIENT
Start: 2019-03-27 | End: 2019-03-27 | Stop reason: HOSPADM

## 2019-03-27 RX ORDER — CITALOPRAM 20 MG/1
20 TABLET, FILM COATED ORAL 2 TIMES DAILY
Status: DISCONTINUED | OUTPATIENT
Start: 2019-03-27 | End: 2019-03-29 | Stop reason: HOSPADM

## 2019-03-27 RX ORDER — EPHEDRINE SULFATE 50 MG/ML
INJECTION, SOLUTION INTRAVENOUS AS NEEDED
Status: DISCONTINUED | OUTPATIENT
Start: 2019-03-27 | End: 2019-03-27 | Stop reason: HOSPADM

## 2019-03-27 RX ORDER — LEVOTHYROXINE SODIUM 88 UG/1
88 TABLET ORAL
Status: DISCONTINUED | OUTPATIENT
Start: 2019-03-28 | End: 2019-03-29 | Stop reason: HOSPADM

## 2019-03-27 RX ORDER — ACETAMINOPHEN 325 MG/1
650 TABLET ORAL EVERY 6 HOURS
Status: DISCONTINUED | OUTPATIENT
Start: 2019-03-28 | End: 2019-03-29 | Stop reason: HOSPADM

## 2019-03-27 RX ORDER — FLUMAZENIL 0.1 MG/ML
0.2 INJECTION INTRAVENOUS AS NEEDED
Status: DISCONTINUED | OUTPATIENT
Start: 2019-03-27 | End: 2019-03-27 | Stop reason: HOSPADM

## 2019-03-27 RX ORDER — SODIUM CHLORIDE 0.9 % (FLUSH) 0.9 %
5-40 SYRINGE (ML) INJECTION EVERY 8 HOURS
Status: DISCONTINUED | OUTPATIENT
Start: 2019-03-27 | End: 2019-03-29 | Stop reason: HOSPADM

## 2019-03-27 RX ORDER — OXYCODONE HYDROCHLORIDE 5 MG/1
10-15 TABLET ORAL
Status: DISCONTINUED | OUTPATIENT
Start: 2019-03-27 | End: 2019-03-29 | Stop reason: HOSPADM

## 2019-03-27 RX ORDER — SODIUM CHLORIDE, SODIUM LACTATE, POTASSIUM CHLORIDE, CALCIUM CHLORIDE 600; 310; 30; 20 MG/100ML; MG/100ML; MG/100ML; MG/100ML
INJECTION, SOLUTION INTRAVENOUS
Status: DISCONTINUED | OUTPATIENT
Start: 2019-03-27 | End: 2019-03-27 | Stop reason: HOSPADM

## 2019-03-27 RX ORDER — GABAPENTIN 300 MG/1
600 CAPSULE ORAL EVERY 8 HOURS
Status: DISCONTINUED | OUTPATIENT
Start: 2019-03-27 | End: 2019-03-29 | Stop reason: HOSPADM

## 2019-03-27 RX ORDER — PROPOFOL 10 MG/ML
INJECTION, EMULSION INTRAVENOUS
Status: DISCONTINUED | OUTPATIENT
Start: 2019-03-27 | End: 2019-03-27 | Stop reason: HOSPADM

## 2019-03-27 RX ORDER — DIPHENHYDRAMINE HCL 25 MG
25 CAPSULE ORAL
Status: DISCONTINUED | OUTPATIENT
Start: 2019-03-27 | End: 2019-03-29 | Stop reason: HOSPADM

## 2019-03-27 RX ORDER — OXYCODONE HYDROCHLORIDE 15 MG/1
15 TABLET ORAL
Qty: 28 TAB | Refills: 0 | Status: SHIPPED | OUTPATIENT
Start: 2019-03-27 | End: 2019-04-03

## 2019-03-27 RX ORDER — OXYCODONE HYDROCHLORIDE 5 MG/1
10 TABLET ORAL
Status: COMPLETED | OUTPATIENT
Start: 2019-03-27 | End: 2019-03-27

## 2019-03-27 RX ORDER — OXYBUTYNIN CHLORIDE 5 MG/1
5 TABLET ORAL 3 TIMES DAILY
Status: DISCONTINUED | OUTPATIENT
Start: 2019-03-27 | End: 2019-03-29 | Stop reason: HOSPADM

## 2019-03-27 RX ORDER — CYCLOBENZAPRINE HCL 10 MG
10 TABLET ORAL
Status: DISCONTINUED | OUTPATIENT
Start: 2019-03-27 | End: 2019-03-29 | Stop reason: HOSPADM

## 2019-03-27 RX ORDER — GABAPENTIN 300 MG/1
600 CAPSULE ORAL ONCE
Status: COMPLETED | OUTPATIENT
Start: 2019-03-27 | End: 2019-03-27

## 2019-03-27 RX ORDER — CEFAZOLIN SODIUM/WATER 2 G/20 ML
2 SYRINGE (ML) INTRAVENOUS ONCE
Status: COMPLETED | OUTPATIENT
Start: 2019-03-27 | End: 2019-03-27

## 2019-03-27 RX ADMIN — KETAMINE HYDROCHLORIDE 15 MG: 100 INJECTION, SOLUTION INTRAMUSCULAR; INTRAVENOUS at 14:44

## 2019-03-27 RX ADMIN — FENTANYL CITRATE 25 MCG: 50 INJECTION, SOLUTION INTRAMUSCULAR; INTRAVENOUS at 14:30

## 2019-03-27 RX ADMIN — EPHEDRINE SULFATE 5 MG: 50 INJECTION, SOLUTION INTRAVENOUS at 13:31

## 2019-03-27 RX ADMIN — OXYCODONE HYDROCHLORIDE 10 MG: 5 TABLET ORAL at 17:50

## 2019-03-27 RX ADMIN — TEMAZEPAM 15 MG: 15 CAPSULE ORAL at 20:27

## 2019-03-27 RX ADMIN — PROPOFOL 150 MG: 10 INJECTION, EMULSION INTRAVENOUS at 12:52

## 2019-03-27 RX ADMIN — EPHEDRINE SULFATE 5 MG: 50 INJECTION, SOLUTION INTRAVENOUS at 13:26

## 2019-03-27 RX ADMIN — Medication 2 G: at 20:35

## 2019-03-27 RX ADMIN — Medication 5 ML: at 21:58

## 2019-03-27 RX ADMIN — HYDROMORPHONE HYDROCHLORIDE 0.5 MG: 2 INJECTION, SOLUTION INTRAMUSCULAR; INTRAVENOUS; SUBCUTANEOUS at 17:18

## 2019-03-27 RX ADMIN — Medication 1 G: at 12:55

## 2019-03-27 RX ADMIN — FENTANYL CITRATE 25 MCG: 50 INJECTION, SOLUTION INTRAMUSCULAR; INTRAVENOUS at 14:25

## 2019-03-27 RX ADMIN — SODIUM CHLORIDE, SODIUM LACTATE, POTASSIUM CHLORIDE, CALCIUM CHLORIDE: 600; 310; 30; 20 INJECTION, SOLUTION INTRAVENOUS at 12:42

## 2019-03-27 RX ADMIN — ONDANSETRON 4 MG: 2 INJECTION INTRAMUSCULAR; INTRAVENOUS at 16:50

## 2019-03-27 RX ADMIN — Medication 3 AMPULE: at 11:47

## 2019-03-27 RX ADMIN — LIDOCAINE HYDROCHLORIDE 80 MG: 20 INJECTION, SOLUTION EPIDURAL; INFILTRATION; INTRACAUDAL; PERINEURAL at 12:52

## 2019-03-27 RX ADMIN — EPHEDRINE SULFATE 5 MG: 50 INJECTION, SOLUTION INTRAVENOUS at 13:24

## 2019-03-27 RX ADMIN — KETAMINE HYDROCHLORIDE 15 MG: 100 INJECTION, SOLUTION INTRAMUSCULAR; INTRAVENOUS at 13:39

## 2019-03-27 RX ADMIN — ROCURONIUM BROMIDE 5 MG: 10 INJECTION, SOLUTION INTRAVENOUS at 12:52

## 2019-03-27 RX ADMIN — GABAPENTIN 600 MG: 300 CAPSULE ORAL at 11:47

## 2019-03-27 RX ADMIN — HYDROMORPHONE HYDROCHLORIDE 0.5 MG: 2 INJECTION, SOLUTION INTRAMUSCULAR; INTRAVENOUS; SUBCUTANEOUS at 17:11

## 2019-03-27 RX ADMIN — PROPOFOL 140 MCG/KG/MIN: 10 INJECTION, EMULSION INTRAVENOUS at 12:56

## 2019-03-27 RX ADMIN — SODIUM CHLORIDE, SODIUM LACTATE, POTASSIUM CHLORIDE, AND CALCIUM CHLORIDE 75 ML/HR: 600; 310; 30; 20 INJECTION, SOLUTION INTRAVENOUS at 11:48

## 2019-03-27 RX ADMIN — SUCCINYLCHOLINE CHLORIDE 140 MG: 20 INJECTION INTRAMUSCULAR; INTRAVENOUS at 12:52

## 2019-03-27 RX ADMIN — FENTANYL CITRATE 50 MCG: 50 INJECTION, SOLUTION INTRAMUSCULAR; INTRAVENOUS at 12:44

## 2019-03-27 RX ADMIN — Medication 1 G: at 13:00

## 2019-03-27 RX ADMIN — HYDROMORPHONE HYDROCHLORIDE 0.5 MG: 2 INJECTION, SOLUTION INTRAMUSCULAR; INTRAVENOUS; SUBCUTANEOUS at 17:30

## 2019-03-27 RX ADMIN — FAMOTIDINE 20 MG: 20 TABLET ORAL at 20:13

## 2019-03-27 RX ADMIN — SODIUM CHLORIDE, SODIUM LACTATE, POTASSIUM CHLORIDE, AND CALCIUM CHLORIDE 75 ML/HR: 600; 310; 30; 20 INJECTION, SOLUTION INTRAVENOUS at 20:12

## 2019-03-27 RX ADMIN — PROPOFOL 50 MG: 10 INJECTION, EMULSION INTRAVENOUS at 14:23

## 2019-03-27 RX ADMIN — PROPOFOL 50 MG: 10 INJECTION, EMULSION INTRAVENOUS at 13:59

## 2019-03-27 RX ADMIN — HYDROMORPHONE HYDROCHLORIDE 0.5 MG: 2 INJECTION, SOLUTION INTRAMUSCULAR; INTRAVENOUS; SUBCUTANEOUS at 17:24

## 2019-03-27 RX ADMIN — OXYBUTYNIN CHLORIDE 5 MG: 5 TABLET ORAL at 20:27

## 2019-03-27 RX ADMIN — MORPHINE SULFATE 2 MG: 2 INJECTION, SOLUTION INTRAMUSCULAR; INTRAVENOUS at 21:58

## 2019-03-27 RX ADMIN — FENTANYL CITRATE 50 MCG: 50 INJECTION, SOLUTION INTRAMUSCULAR; INTRAVENOUS at 13:08

## 2019-03-27 RX ADMIN — HYDROMORPHONE HYDROCHLORIDE 0.5 MG: 2 INJECTION, SOLUTION INTRAMUSCULAR; INTRAVENOUS; SUBCUTANEOUS at 18:14

## 2019-03-27 RX ADMIN — DEXAMETHASONE SODIUM PHOSPHATE 8 MG: 4 INJECTION, SOLUTION INTRA-ARTICULAR; INTRALESIONAL; INTRAMUSCULAR; INTRAVENOUS; SOFT TISSUE at 12:57

## 2019-03-27 RX ADMIN — GABAPENTIN 600 MG: 300 CAPSULE ORAL at 20:27

## 2019-03-27 RX ADMIN — ACETAMINOPHEN 1000 MG: 10 INJECTION, SOLUTION INTRAVENOUS at 17:46

## 2019-03-27 RX ADMIN — KETAMINE HYDROCHLORIDE 35 MG: 100 INJECTION, SOLUTION INTRAMUSCULAR; INTRAVENOUS at 12:52

## 2019-03-27 RX ADMIN — DOCUSATE SODIUM 100 MG: 100 CAPSULE, LIQUID FILLED ORAL at 20:13

## 2019-03-27 RX ADMIN — FENTANYL CITRATE 50 MCG: 50 INJECTION, SOLUTION INTRAMUSCULAR; INTRAVENOUS at 14:23

## 2019-03-27 RX ADMIN — Medication 1 AMPULE: at 20:28

## 2019-03-27 NOTE — PROGRESS NOTES
TRANSFER - IN REPORT: 
 
Verbal report received from RASHEL Davis on Charisse Gonzalez  being received from PACU for routine post - op Report consisted of patients Situation, Background, Assessment and  
Recommendations(SBAR). Information from the following report(s) SBAR, Kardex, OR Summary, Procedure Summary, Intake/Output, MAR and Recent Results was reviewed with the receiving nurse. Opportunity for questions and clarification was provided. Assessment completed upon patients arrival to unit and care assumed.

## 2019-03-27 NOTE — PROGRESS NOTES
03/27/19 1921 Dual Skin Pressure Injury Assessment Dual Skin Pressure Injury Assessment WDL Second Care Provider (Based on [address] [place] [address]) Christianne Saab RN Skin Integumentary Skin Integumentary (WDL) X Skin Condition/Temp Dry; Warm  
Skin Color Appropriate for ethnicity; Other (comment) (Rash to R. wrist, some upper back, L arm.) Skin Integrity Incision (comment) (Incision: Back X 2) Turgor Non-tenting Wound Prevention and Protection Methods Orientation of Wound Prevention Posterior Location of Wound Prevention Sacrum/Coccyx Dressing Present  No  
Wound Offloading (Prevention Methods) Bed, pressure redistribution/air;Bed, pressure reduction mattress;Repositioning;Pillows; Turning

## 2019-03-27 NOTE — OP NOTES
[address] [address]   [phone]    OPERATIVE REPORT    Patient ID:Samara Olson  [id number]  1970  47 y.o. DATE OF SURGERY: 3/27/2019    SURGEON: Dr. Gail Machado DIAGNOSIS:     1. Recurrent lumbar stenosis in the area of a prior lumbar decompression  2. Scoliosis    POSTOPERATIVE DIAGNOSIS:     1. Recurrent lumbar stenosis in the area of a prior lumbar decompression  2. Scoliosis     PROCEDURE:    1. Minimally invasive L3 - L4 direct lateral arthrodesis  2. Revision lumbar laminectomy  L3 through L4  with bilateral foraminotomies. 3. Lumbar posterolateral fusion  L3 through L4 .  4. Pedicle screw instrumentation  L3 through S1 .  5. Bone marrow aspirate for allograft  6. Insertion biomechanical device L3 through L4   7. Local autograft   8. Exploration fusion L4-S1    ANESTHESIA: General.    ESTIMATED BLOOD LOSS: 275 ml    POSTOPERATIVE CONDITION: Stable. INTRAOPERATIVE COMPLICATIONS: None. IMPLANTS:   Implant Name Type Inv.  Item Serial No.  Lot No. LRB No. Used Action   GRAFT BNE DBM PTTY W/CHIPS 5ML -- BIO DBM - [id number]  GRAFT BNE DBM PTTY W/CHIPS 5ML -- BIO DBM  ALEX [employer] HOWM [id number] N/A 1 Implanted   GRAFT BNE GRAN DBM 2-4MM 10CC -- OSTEOAMP - [id number]  GRAFT BNE GRAN DBM 2-4MM 10CC -- OSTEOAMP HFO--[id number]  N/A 1 Implanted   CAGE SPNE [id number] -- AVS ARIA - [id number]  CAGE SPNE [id number] -- AVS ARIA  ALEX SPINE HOWM [id number] N/A 1 Implanted   BLOCKER SPNE ANIKET 3 TI --  - [id number]  BLOCKER SPNE ANIKET 3 TI --   ALEX SPINE HOWM [id number] N/A 8 Implanted   SCR SPNE POLYAXL 6.5X45MM -- VOGEL - [id number]  SCR SPNE POLYAXL 6.5X45MM -- VOGEL  ALEX SPINE HOWM [id number] N/A 4 Implanted   SCR SPNE POLYAXL 6.5X35MM -- VOGEL - [id number]  SCR SPNE POLYAXL 6.5X35MM -- JASPREET JOHNSON SPINE HOWM [id number] N/A 1 Implanted   [facility] SPNE POLYAXL 6.5X50MM -- Rik Rodriguez - [id number]  SCR SPNE POLYAXL 6.5X50MM -- VOGEL  ALEX SPINE HOWM [id number] N/A 2 Implanted   CARLOTA SPNE ANIKET 3 6.0X90MM TI --  - [id number]  CARLOTA SPNE ANIKET 3 6.0X90MM TI --   ALEX SPINE HOWM [id number] N/A 2 Implanted   7.5x35 screw    Marcos Bennett [facility] [id number] N/A 1 Implanted       INDICATIONS FOR PROCEDURE: Back and leg pain consistent with claudication/lumbar radiculitis that is no longer responsive to conservative measures. Walking and standing tolerances have diminished. Imaging studies are concordant, showing lumbar stenosis in the area of a prior lumbar laminectomy. In the outpatient setting, the risks, benefits, and potential complications of the above listed procedure were discussed and an informed consent was obtained. DESCRIPTION OF PROCEDURE: After adequate induction of general anesthesia, the patient was placed in the right lateral decubitus position with an axillary roll and the left side up. Care was taken to pad all bony prominences. The patient was secured to the bed with several applications of tape. Preoperative antibiotics were administered. A time out was called after the patient's flank was prepped and draped in the usual sterile fashion. At this point, C-arm fluoroscopy was brought in and used to identify externally the location of the appropriate disk space. A transverse incision was created directly over disc space on the flank. The fascial plane was entered using Metzenbaum scissors and digital palpation was performed to palpate the transverse processes. The finger was then rolled up over the iliopsoas, which was palpated as well. Then, through the direct lateral incision, the monitoring probe was inserted through the fascial plane and directed to the iliopsoas surface. At this point, the monitoring and EMG equipment was applied and monitored during dissection through the iliopsoas muscle.  The dilator probe was advanced through the iliopsoas and directed toward the central 1/3 of the L3 - L4  interspace and docked laterally directly over the annulus fibrosis. The probe was inserted directly into the disc space. Then, a guidewire was inserted through the cannulated probe. This was confirmed fluoroscopically in AP and lateral planes and was felt to be satisfactory. At this point, the dissection was sequentially dilated and finally the minimally invasive retractor was inserted over the final dilator. Once the retractor was secured, the dilators were removed and the retractor was secured with the locking pin into the vertebral body. The guidepin was then removed. Light sources were applied and the area was checked with the monitoring probe. The annulotomy knife was used to perform an annulotomy of the  L3 - L4 disk space. A complete diskectomy was performed using a series of pituitary rongeurs, rasps and curets. A Metzger elevator was inserted and impacted across the contralateral annulus fibrosis. This was done under fluoroscopic visualization. Once all disk material was removed and the implants had been prepared, the sizing paddles were inserted and increased in size until there was a good fit. The trial implant was inserted and visualized fluoroscopically in both AP and lateral planes and was felt to be satisfactory. The PEEK cage device was then selected. Allograft demineralized bone matrix was prepared on the back table and inserted into the cage. The PEEK cage was then impacted under fluoroscopic visualization to be in the central portion of the disk space and inserted across both endplates on the coronal view. This was felt to be satisfactory with radiographic imaging. With this, the minimally invasive retractor was removed. Final fluoroscopic images were obtained in both planes and felt to be satisfactory. The superficial wound was liberally irrigated and the wound was approximated with a 2-0 and a 3-0 Vicryl suture in a layered fashion.  Benzoin and Steri-Strips were applied. The patient was then reopositioned prone on the [facility] spinal table. Care was taken to pad all bony prominences. The shoulders and elbows were placed in the 90/90 position. The abdomen was allowed to hang free to decrease intraabdominal and venous pressure. The lumbar area was prepped and draped in the usual sterile fashion. A second time out was called to confirm the appropriate patient, proposed procedure and proposed incision sites. With this conformation, an incision was created midline, over the lumbar spinous processes. Dissection was carried down to the lumbodorsal fascia. The lumbodorsal fascia and paraspinous musculature were elevated in a subperiosteal fashion, laterally off of the spinous processes and lamina. A curet was slipped beneath the lamina and a cross table fluoroscopic image was obtained to identify the appropriate spinal level. The prior instrumentation at L4-S1 was exposed and removed. One of the set screws was cold welded and had to be removed with the metal cutting jack. The pars interarticularis was exposed at each level. The prior fusion at L4-S1 was stripped of soft tissue and the fusion mass was explored to consolidation. There was felt to be a solid fusion. The deep retractors were placed to facilitate visualization. A Leksell rongeur was used to resect the remaining spinous processes of  L3 - L4. The 4 mm jack was used to thin the remaining lamina to an eggshell like thickness. The operative microscope was brought to the field and used to visualize the neural elements during the decompression. A triple-0 angled curet was used to elevated the scar off of its origin on the caudal surface of the L3 lamina as well as off the cephalad surface of the adjacent lamina. The scar was elevated from the thecal sac and a plane was created in the epidural space with the [facility]. A 4 mm Kerrison was used to perform a revision laminectomy of  L3 - L4 .  The central laminectomy was widened to the medial border of the pedicle at each level. With the central laminectomy completed, a 4 mm Kerrison was used to under cut the lateral recess along the entire length of the laminectomy site. Then using the [facility] elevator to retract the thecal sac and identify each of the nerve roots, partial foraminotomies were performed and each nerve was visualized and decompressed bilaterally. With this, attention was directed toward the left iliac crest where a separate fascial incision was created over the posterior-superior iliac spine. The 8 gauge needle was impacted into the posterior superior iliac spine to a depth of about 2 cm. Bone marrow aspirate was obtained and spread over the allograft bone. The needle was removed and pressure applied over the posterior superior iliac spine. The 4 mm jack was used to decorticate the previously exposed transverse processes and lateral aspect of the facet joints and pars intra-articularis. The [employer] Kiss[employer]a spinal instrumentation system was brought to the field and using a free hand intersection technique, pedicle screws were placed bilaterally from L3 to L5. The medial border of the pedicle was visualized through the spinal canal to confirm no medial or inferior breech. This was felt to be satisfactory. At this point the bone marrow soaked allograft was then packed into the lateral gutters beneath the screw heads, along the decorticated transverse processes and lateral facet joints for the arthrodesis. Appropriately sized rods were then selected and bent into additional lordosis and laid into the pedicle screw heads from  L3 to L5. The set screws were then applied and tightened to the appropriate torque. C-arm fluoroscopy was brought in and used to obtain images to confirm appropriate hardware level and placement. This was felt to be satisfactory.   With this, the wound was liberally irrigated and a hemovac drain was inserted through a separate incision in a subfascial plane. The lumbodorsal fascia was approximated with a # 1 Vicryl suture in an interrupted fashion. The subcutaneous tissue and skin were approximated in a layered fashion. Benzoin and Steri-Strips were applied. Sterile dressings were applied. The patient tolerated the procedure well and was returned to the postanesthesia care unit in stable condition. At the end of the case, all sponge, needle, and instrument counts were correct.      Meg Ramsay MD

## 2019-03-27 NOTE — PERIOP NOTES
TRANSFER - OUT REPORT: 
 
Verbal report given to Lisette KISER(name) on Edyth Dose  being transferred to 717(unit) for routine post - op Report consisted of patients Situation, Background, Assessment and  
Recommendations(SBAR). Information from the following report(s) SBAR, Kardex, OR Summary, Procedure Summary, Intake/Output and MAR was reviewed with the receiving nurse. Lines:  
Peripheral IV 03/27/19 Left Forearm (Active) Site Assessment Clean, dry, & intact 3/27/2019  4:59 PM  
Phlebitis Assessment 0 3/27/2019  4:59 PM  
Infiltration Assessment 0 3/27/2019  4:59 PM  
Dressing Status Clean, dry, & intact 3/27/2019  4:59 PM  
Dressing Type Tape;Transparent 3/27/2019  4:59 PM  
Hub Color/Line Status Green; Infusing 3/27/2019  4:59 PM  
   
Peripheral IV Posterior;Right Hand (Active) Site Assessment Clean, dry, & intact 3/27/2019  4:59 PM  
Phlebitis Assessment 0 3/27/2019  4:59 PM  
Infiltration Assessment 0 3/27/2019  4:59 PM  
Dressing Status Clean, dry, & intact 3/27/2019  4:59 PM  
Dressing Type Tape;Transparent 3/27/2019  4:59 PM  
Hub Color/Line Status Pink; Infusing;Flushed 3/27/2019  4:59 PM  
  
 
Opportunity for questions and clarification was provided. Patient transported with: 
 O2 @ 2 liters VTE prophylaxis orders have been written for Edyth Dose. Patient and family given floor number and nurses name. Family updated re: pt status after security code verified.

## 2019-03-27 NOTE — ANESTHESIA PREPROCEDURE EVALUATION
Relevant Problems No relevant active problems Anesthetic History No history of anesthetic complications Review of Systems / Medical History Patient summary reviewed and pertinent labs reviewed Pulmonary Within defined limits Neuro/Psych Psychiatric history Comments: Chronic pain Cardiovascular Hypertension Exercise tolerance: <4 METS 
  
GI/Hepatic/Renal 
Within defined limits Endo/Other Hypothyroidism: well controlled Arthritis Other Findings Physical Exam 
 
Airway Mallampati: I 
TM Distance: > 6 cm Neck ROM: normal range of motion Mouth opening: Normal 
 
 Cardiovascular Rhythm: regular Rate: normal 
 
 
 
 Dental 
No notable dental hx Pulmonary Breath sounds clear to auscultation Abdominal 
 
 
 
 Other Findings Anesthetic Plan ASA: 2 Anesthesia type: general 
 
 
 
 
 
Anesthetic plan and risks discussed with: Patient

## 2019-03-28 PROCEDURE — 74011250637 HC RX REV CODE- 250/637: Performed by: ORTHOPAEDIC SURGERY

## 2019-03-28 PROCEDURE — 65270000029 HC RM PRIVATE

## 2019-03-28 PROCEDURE — 97165 OT EVAL LOW COMPLEX 30 MIN: CPT

## 2019-03-28 PROCEDURE — 97161 PT EVAL LOW COMPLEX 20 MIN: CPT

## 2019-03-28 PROCEDURE — 97530 THERAPEUTIC ACTIVITIES: CPT

## 2019-03-28 PROCEDURE — 74011250636 HC RX REV CODE- 250/636: Performed by: ORTHOPAEDIC SURGERY

## 2019-03-28 RX ADMIN — Medication 2 G: at 12:55

## 2019-03-28 RX ADMIN — Medication 5 ML: at 12:56

## 2019-03-28 RX ADMIN — Medication 5 ML: at 05:44

## 2019-03-28 RX ADMIN — DOCUSATE SODIUM 100 MG: 100 CAPSULE, LIQUID FILLED ORAL at 16:15

## 2019-03-28 RX ADMIN — MORPHINE SULFATE 2 MG: 2 INJECTION, SOLUTION INTRAMUSCULAR; INTRAVENOUS at 16:12

## 2019-03-28 RX ADMIN — ACETAMINOPHEN 650 MG: 325 TABLET, FILM COATED ORAL at 23:26

## 2019-03-28 RX ADMIN — OXYBUTYNIN CHLORIDE 5 MG: 5 TABLET ORAL at 08:19

## 2019-03-28 RX ADMIN — Medication 1 AMPULE: at 08:20

## 2019-03-28 RX ADMIN — ACETAMINOPHEN 650 MG: 325 TABLET, FILM COATED ORAL at 05:41

## 2019-03-28 RX ADMIN — Medication 10 ML: at 21:27

## 2019-03-28 RX ADMIN — Medication 1 AMPULE: at 21:27

## 2019-03-28 RX ADMIN — ACETAMINOPHEN 650 MG: 325 TABLET, FILM COATED ORAL at 16:15

## 2019-03-28 RX ADMIN — CYCLOBENZAPRINE HYDROCHLORIDE 10 MG: 10 TABLET, FILM COATED ORAL at 16:15

## 2019-03-28 RX ADMIN — LEVOTHYROXINE SODIUM 88 MCG: 88 TABLET ORAL at 05:41

## 2019-03-28 RX ADMIN — Medication 2 G: at 05:41

## 2019-03-28 RX ADMIN — GABAPENTIN 600 MG: 300 CAPSULE ORAL at 21:27

## 2019-03-28 RX ADMIN — ESTRADIOL 2 MG: 1 TABLET ORAL at 08:20

## 2019-03-28 RX ADMIN — OXYCODONE HYDROCHLORIDE 10 MG: 5 TABLET ORAL at 18:48

## 2019-03-28 RX ADMIN — CITALOPRAM HYDROBROMIDE 20 MG: 20 TABLET ORAL at 16:15

## 2019-03-28 RX ADMIN — ACETAMINOPHEN 650 MG: 325 TABLET, FILM COATED ORAL at 00:07

## 2019-03-28 RX ADMIN — ACETAMINOPHEN 650 MG: 325 TABLET, FILM COATED ORAL at 11:36

## 2019-03-28 RX ADMIN — MORPHINE SULFATE 2 MG: 2 INJECTION, SOLUTION INTRAMUSCULAR; INTRAVENOUS at 08:28

## 2019-03-28 RX ADMIN — FAMOTIDINE 20 MG: 20 TABLET ORAL at 21:27

## 2019-03-28 RX ADMIN — GABAPENTIN 600 MG: 300 CAPSULE ORAL at 12:56

## 2019-03-28 RX ADMIN — OXYBUTYNIN CHLORIDE 5 MG: 5 TABLET ORAL at 16:15

## 2019-03-28 RX ADMIN — OXYCODONE HYDROCHLORIDE 15 MG: 5 TABLET ORAL at 00:07

## 2019-03-28 RX ADMIN — OXYBUTYNIN CHLORIDE 5 MG: 5 TABLET ORAL at 21:27

## 2019-03-28 RX ADMIN — MORPHINE SULFATE 2 MG: 2 INJECTION, SOLUTION INTRAMUSCULAR; INTRAVENOUS at 23:26

## 2019-03-28 RX ADMIN — OXYCODONE HYDROCHLORIDE 10 MG: 5 TABLET ORAL at 11:37

## 2019-03-28 RX ADMIN — GABAPENTIN 600 MG: 300 CAPSULE ORAL at 05:41

## 2019-03-28 RX ADMIN — OXYCODONE HYDROCHLORIDE 15 MG: 5 TABLET ORAL at 05:43

## 2019-03-28 RX ADMIN — FAMOTIDINE 20 MG: 20 TABLET ORAL at 08:20

## 2019-03-28 RX ADMIN — CITALOPRAM HYDROBROMIDE 20 MG: 20 TABLET ORAL at 08:19

## 2019-03-28 RX ADMIN — LISINOPRIL 10 MG: 5 TABLET ORAL at 08:19

## 2019-03-28 RX ADMIN — DOCUSATE SODIUM 100 MG: 100 CAPSULE, LIQUID FILLED ORAL at 08:20

## 2019-03-28 NOTE — PROGRESS NOTES
Dr. Sangeeta Garcia, on call MD notified and just told nursing staff to monitor and call if extreme drainage.

## 2019-03-28 NOTE — PROGRESS NOTES
Interdisciplinary team rounds were held 3/28/2019 with the following team members:Care Management, Nursing, Occupational Therapy and . Anticipate discharge home in 1-2 days. Plan of care discussed. See clinical pathway and/or care plan for interventions and desired outcomes.

## 2019-03-28 NOTE — PROGRESS NOTES
Problem: Self Care Deficits Care Plan (Adult) Goal: *Acute Goals and Plan of Care (Insert Text) Description 1. Patient will verbalize and demonstrate understanding of spinal precautions with 100% accuracy during ADLs. 2. Patient will complete lower body bathing and dressing with setup and adaptive equipment as needed. 3. Patient will complete functional transfers with supervision and adaptive equipment as needed. 4. Patient will complete toileting and toilet transfer with supervision. 5. Patient will complete functional mobility of household distances with supervision and adaptive equipment as needed. 6. Patient will demonstrate ability to log roll in bed with supervision and no verbal cues from therapist.  
 
Timeframe: 7 visits Outcome: Progressing Towards Goal 
  
 
OCCUPATIONAL THERAPY: Initial Assessment 3/28/2019 INPATIENT:   
Payor: Abhay Villafana / Plan: [facility] / Product Type: PPO /  
  
NAME/AGE/GENDER: Nadege Valle is a 47 y.o. female PRIMARY DIAGNOSIS:  Lumbar stenosis with neurogenic claudication [M48.062] Spondylolisthesis, unspecified spinal region [M43.10] Other idiopathic scoliosis, unspecified spinal region [M41.20] Lumbar stenosis with neurogenic claudication [M48.062] Lumbar stenosis with neurogenic claudication Lumbar stenosis with neurogenic claudication Procedure(s) (LRB): 
L3 4 DLIF SSEP (N/A) POSTERIOR REVISION LAMINECTOMY L3 L4 and posterior fusion L3-4 BONE MARROW ASPIRATE, ALLOGRAFT, AND INSTRUMENTATION L3-S1 (N/A) 1 Day Post-Op ICD-10: Treatment Diagnosis: Low Back Pain (M54.5) Precautions/Allergies: SPINAL PRECAUTIONS Patient has no known allergies. ASSESSMENT:  
Ms. Candida Hilton is a 47year old female who is now s/p above procedure. At baseline she lives with her spouse and is typically independent with ADLs, but did need help with LB dressing.  She was able to complete IADLs with modified independence from seated position from her rollator. She was limited in walking long distances and did need a cane occasionally. She admits to multiple falls in the past month. She is R hand dominant. Patient seated in chair upon arrival, agreeable to OT evaluation. Reports pain 6/10 in back. Discussed spinal precautions and log rolling technique. BUE assessment reveals ROM, strength are WFL. Able to stand with CGA and take steps from chair to bed with CGA. Returned to supine with log roll technique and CGA. Patient is currently functioning below her baseline and would benefit from continued occupational therapy to increase independence and safety. Will follow. This section established at most recent assessment PROBLEM LIST (Impairments causing functional limitations): 
Decreased ADL/Functional Activities Decreased Transfer Abilities Decreased Ambulation Ability/Technique Decreased Balance Increased Pain Decreased Activity Tolerance Decreased Flexibility/Joint Mobility INTERVENTIONS PLANNED: (Benefits and precautions of occupational therapy have been discussed with the patient.) Activities of daily living training Adaptive equipment training Therapeutic activity Therapeutic exercise TREATMENT PLAN: Frequency/Duration: Follow patient 3x/ week to address above goals. Rehabilitation Potential For Stated Goals: Good RECOMMENDED REHABILITATION/EQUIPMENT: (at time of discharge pending progress): Due to the probability of continued deficits (see above) this patient will likely need continued skilled occupational therapy after discharge. Equipment: TBD OCCUPATIONAL PROFILE AND HISTORY:  
History of Present Injury/Illness (Reason for Referral): S/p above procedure Past Medical History/Comorbidities: Ms. Jesus Parada  has a past medical history of Ambulates with cane, Arthritis, Chronic pain, Depression, Gait instability, Hypertension, and Thyroid disease. She also has no past medical history of COPD, Other ill-defined conditions(799.89), or Unspecified adverse effect of anesthesia. Ms. Ramón Burciaga  has a past surgical history that includes hx hysterectomy (1998); hx salpingo-oophorectomy (2018); hx cervical fusion (2007); hx lumbar fusion (2016); hx urological (2007); and hx colonoscopy. Social History/Living Environment:  
Home Environment: Private residence # Steps to Enter: 4 Rails to Enter: Yes Hand Rails : Bilateral 
One/Two Story Residence: One story Living Alone: No 
Support Systems: Spouse/Significant Other/Partner Patient Expects to be Discharged to[de-identified] Private residence Current DME Used/Available at Home: Walker, rolling, Cane, straight Tub or Shower Type: Tub/Shower combination Prior Level of Function/Work/Activity: At baseline she lives with her spouse and is typically independent with ADLs, but did need help with LB dressing. She was able to complete IADLs with modified independence from seated position from her rollator. She was limited in walking long distances and did need a cane occasionally. She admits to multiple falls in the past month. She is R hand dominant. Number of Personal Factors/Comorbidities that affect the Plan of Care: Brief history (0):  LOW COMPLEXITY ASSESSMENT OF OCCUPATIONAL PERFORMANCE[de-identified]  
Activities of Daily Living:  
Basic ADLs (From Assessment) Complex ADLs (From Assessment) Feeding: Setup Oral Facial Hygiene/Grooming: Setup Bathing: Moderate assistance Upper Body Dressing: Setup Lower Body Dressing: Moderate assistance Toileting: Minimum assistance Instrumental ADL Meal Preparation: Maximum assistance Homemaking: Maximum assistance Medication Management: Independent Financial Management: Independent Grooming/Bathing/Dressing Activities of Daily Living Cognitive Retraining Safety/Judgement: Awareness of environment; Fall prevention; Insight into deficits Bed/Mat Mobility Rolling: Stand-by assistance Supine to Sit: Contact guard assistance Sit to Supine: Minimum assistance Sit to Stand: Contact guard assistance Stand to Sit: Contact guard assistance Bed to Chair: Contact guard assistance Scooting: Contact guard assistance Most Recent Physical Functioning:  
Gross Assessment: 
AROM: Within functional limits Strength: Within functional limits Posture: 
Posture (WDL): Exceptions to [facility] Posture Assessment: Forward head Balance: 
Sitting: Intact Standing: Impaired Standing - Static: Good Standing - Dynamic : Fair Bed Mobility: 
Rolling: Stand-by assistance Supine to Sit: Contact guard assistance Sit to Supine: Minimum assistance Scooting: Contact guard assistance Wheelchair Mobility: 
  
Transfers: 
Sit to Stand: Contact guard assistance Stand to Sit: Contact guard assistance Bed to Chair: Contact guard assistance Patient Vitals for the past 6 hrs: 
 BP BP Patient Position SpO2 Pulse 19 0748 138/82 At rest 93 % 91 Mental Status Neurologic State: Alert Orientation Level: Oriented X4 Cognition: Follows commands Perception: Appears intact Perseveration: No perseveration noted Safety/Judgement: Awareness of environment, Fall prevention, Insight into deficits Physical Skills Involved: 
Range of Motion Balance Strength Activity Tolerance Pain (acute) Cognitive Skills Affected (resulting in the inability to perform in a timely and safe manner): 
None  Psychosocial Skills Affected: 
Habits/Routines Environmental Adaptation Social Roles Number of elements that affect the Plan of Care: 5+:  HIGH COMPLEXITY [facility]IN[facility] DECISION [facility] [address] [address] AM-PAC? ?6 Clicks? Daily Activity Inpatient Short Form How much help from another person does the patient currently need. .. Total A Lot A Little None 1. Putting on and taking off regular lower body clothing? ? 1   ? 2   ? 3   ? 4 2. Bathing (including washing, rinsing, drying)? ? 1   ? 2   ? 3   ? 4  
3. Toileting, which includes using toilet, bedpan or urinal?   ? 1   ? 2   ? 3   ? 4  
4. Putting on and taking off regular upper body clothing? ? 1   ? 2   ? 3   ? 4  
5. Taking care of personal grooming such as brushing teeth? ? 1   ? 2   ? 3   ? 4  
6. Eating meals? ? 1   ? 2   ? 3   ? 4  
© 2007, Trustees of [address] [place] [address] [address] [address], under license to [employer]. All rights reserved Score:  Initial: 15 Most Recent: X (Date: -- ) Interpretation of Tool:  Represents activities that are increasingly more difficult (i.e. Bed mobility, Transfers, Gait). Medical Necessity:    
Patient demonstrates good 
 rehab potential due to higher previous functional level. Reason for Services/Other Comments: 
Patient continues to require present interventions due to patient's inability to care for self while maintaining spinal precautions Artist Hazy Use of outcome tool(s) and clinical judgement create a POC that gives a: MODERATE COMPLEXITY  
 
 
 
TREATMENT:  
(In addition to Assessment/Re-Assessment sessions the following treatments were rendered) Pre-treatment Symptoms/Complaints:   
Pain: Initial:  
Pain Intensity 1: 6 Pain Location 1: Back Pain Intervention(s) 1: Repositioned  Post Session:  same Assessment/Reassessment only, no treatment provided today Braces/Orthotics/Lines/Etc:  
IV 
drain O2 Device: Nasal cannula Treatment/Session Assessment:   
Response to Treatment:  tolerated well Interdisciplinary Collaboration: Occupational Therapist 
Registered Nurse After treatment position/precautions:  
Supine in bed Bed/Chair-wheels locked Bed in low position Call light within reach RN notified Compliance with Program/Exercises: Will assess as treatment progresses. Recommendations/Intent for next treatment session:   \"Next visit will focus on advancements to more challenging activities and reduction in assistance provided\". Total Treatment Duration: OT Patient Time In/Time Out Time In: 1006 Time Out: 1021 Garret Bro OTR/L

## 2019-03-28 NOTE — PROGRESS NOTES
Spiritual Care Visit, initial visit. Brief visit with patient at doorway. Visit by Jessica Bermeo, Staff .  Srinivasa, Leta.CINTHIA., B.A.

## 2019-03-28 NOTE — PROGRESS NOTES
Problem: Mobility Impaired (Adult and Pediatric) Goal: *Acute Goals and Plan of Care (Insert Text) Description STG: 
(1.)Ms. Hernan Fitzpatrick will move from supine to sit and sit to supine , scoot up and down and roll side to side with SUPERVISION within 3 treatment day(s). (2.)Ms. Hernan Fitzpatrick will transfer from bed to chair and chair to bed with SUPERVISION using the least restrictive device within 3 treatment day(s). (3.)Ms. Hernan Fitzpatrick will ambulate with SUPERVISION for 250 feet with the least restrictive device within 3 treatment day(s). (4.)Ms. Hernan Fitzpatrick will perform standing static and dynamic balance activities x 151 minutes with SUPERVISION to improve safety within 3 day(s). (5.)Ms. Hernan Fitzpatrick will maintain spinal precautions throughout all functional mobility within 3 days with 0 verbal cues. LTG: 
(1.)Ms. Hernan Fitzpatrick will move from supine to sit and sit to supine , scoot up and down and roll side to side in bed with MODIFIED INDEPENDENCE within 7 treatment day(s). (2.)Ms. Hernan Fitzpatrick will transfer from bed to chair and chair to bed with MODIFIED INDEPENDENCE using the least restrictive device within 7 treatment day(s). (3.)Ms. Hernan Fitzpatrick will ambulate with MODIFIED INDEPENDENCE for 500 feet with the least restrictive device within 7 treatment day(s). (4.)Ms. Hernan Fitzpatrick will perform standing static and dynamic balance activities x 15 minutes with MODIFIED INDEPENDENCE to improve safety within 7 day(s). (5.)Ms. Hernan Fitzpatrick will ascend and descend 4 stairs using B hand rail(s) with MODIFIED INDEPENDENCE to improve functional mobility and safety within 7 day(s). ________________________________________________________________________________________________ 
  
3/28/2019 1108 by Laura Benedcit DPT Outcome: Progressing Towards Goal 
 
PHYSICAL THERAPY: Daily Note and PM 3/28/2019 INPATIENT: PT Visit Days : 1 Payor: DENNY JONES / Plan: [place] BLUE CROSS [facility] / Product Type: PPO /   
  
 NAME/AGE/GENDER: Delgado Leyva is a 47 y.o. female PRIMARY DIAGNOSIS: Lumbar stenosis with neurogenic claudication [M48.062] Spondylolisthesis, unspecified spinal region [M43.10] Other idiopathic scoliosis, unspecified spinal region [M41.20] Lumbar stenosis with neurogenic claudication [M48.062] Lumbar stenosis with neurogenic claudication Lumbar stenosis with neurogenic claudication Procedure(s) (LRB): 
L3 4 DLIF SSEP (N/A) POSTERIOR REVISION LAMINECTOMY L3 L4 and posterior fusion L3-4 BONE MARROW ASPIRATE, ALLOGRAFT, AND INSTRUMENTATION L3-S1 (N/A) 1 Day Post-Op ICD-10: Treatment Diagnosis: · Difficulty in walking, Not elsewhere classified (R26.2) Precaution/Allergies: 
Patient has no known allergies. ASSESSMENT:  
Ms. Jessenia Serrano is a 47 y.o. Female admitted s/p above surgery. She lives with spouse in a single story home and typically ambulates independently, however reports when she first gets up and at night when she's tired she has to use a rollator walker to get around. Admits to frequent falls (about 3 per month). She drives and performs ADLs without assistance. Supine on contact and agreeable to physical therapy evaluation and treatment. She was educated on spinal precautions and log roll technique, able to perform with CGA. Reports her RLE feels as though it \"shaw,\" at times, however today LLE with increased pain. 4-/5 strength in BLE and intact sensation L2-S1. She stood with CGA and ambulated within room with CGA to minimal assist. Treatment initiated to include ambulation in hallway with rolling walker, no losses of balance and requiring cues for walker negotiation within hallway as well as posture. Treatment continued upon return to room with sit to stand transfers from various surface heights, standing balance activities.  Delgado Leyva is currently functioning below her baseline and would benefit from skilled PT during acute care stay to maximize safety and independence with functional mobility. PM Note: patient supine on contact and agreeable to physical therapy treatment. CGA to transfer to sitting, stood with CGA and ambulated with minimal assist via hand held assist into restroom. CGA for sit<>stand transfers from various surface heights. Requires cues for posture and safety during turns with ambulation with walker, however able to increase ambulation distance to 500' with RW this PM and stand by assist with no losses of balance. Good progress in gait distance and assistance required. Cues and some assist required to perform log roll back into bed. Will continue therapy efforts. This section established at most recent assessment PROBLEM LIST (Impairments causing functional limitations): 1. Decreased Strength 2. Decreased ADL/Functional Activities 3. Decreased Transfer Abilities 4. Decreased Ambulation Ability/Technique 5. Decreased Balance 6. Increased Pain 7. Decreased Knowledge of Precautions 8. Decreased [place] with Home Exercise Program 
 INTERVENTIONS PLANNED: (Benefits and precautions of physical therapy have been discussed with the patient.) 1. Balance Exercise 2. Bed Mobility 3. Family Education 4. Gait Training 5. Home Exercise Program (HEP) 6. Therapeutic Activites 7. Therapeutic Exercise/Strengthening 8. Transfer Training TREATMENT PLAN: Frequency/Duration: twice daily for duration of hospital stay Rehabilitation Potential For Stated Goals: Excellent RECOMMENDED REHABILITATION/EQUIPMENT: (at time of discharge pending progress): Due to the probability of continued deficits (see above) this patient will likely need continued skilled physical therapy after discharge. Equipment:  
? None at this time HISTORY:  
History of Present Injury/Illness (Reason for Referral): This is a very pleasant 51year old patient who presents with a several year history of low back pain with constant and progressive radiation to the buttocks and lower extremities, primarily on the right side. The onset of the symptoms was rather insidious. The patient describes the quality of the pain as a deep ache. The patient has noticed a progressive decrease in her ability to walk or stand for any extended period of time. Her standing tolerance is about 35 minutes and walking distance limited to 1 block. She does require a cane for ambulation. Her walking and standing pain is usually relieved with sitting. She has noted that pushing a cart in the store seems to help. She denies any change in bowel or bladder function since the onset of the symptoms. The patient was originally seen here back in 2013 when she was felt to have discogenic back pain. I recommended against surgery. However, she saw Dr. Speedy Schrader who ended up doing an L4-S1 laminectomy and fusion. Apparently, she did not do well with that surgery. Ultimately, she was found to have significant adjacent level breakdown and in 2017 she underwent a revision laminectomy at L3-L4 cephalad to her fusion. The patient states that that helped her symptoms very transiently. However, now they are worse than ever. It interferes with just about all activities of daily living. She cannot sit up straight or stand up straight because of the right-sided flank, buttock, and thigh and groin pain. She has had a workup of the right hip by Dr. Lennox Catalan including a hip MRI that failed to show significant pathology. It was therefore suggested that her problems were stemming from her low back and a lumbar MRI confirmed that. Past Medical History/Comorbidities: Ms. Teresa Avila  has a past medical history of Ambulates with cane, Arthritis, Chronic pain, Depression, Gait instability, Hypertension, and Thyroid disease.  She also has no past medical history of COPD, Other ill-defined conditions(799.89), or Unspecified adverse effect of anesthesia. Ms. Drake Bermudez  has a past surgical history that includes hx hysterectomy (1998); hx salpingo-oophorectomy (2018); hx cervical fusion (2007); hx lumbar fusion (2016); hx urological (2007); and hx colonoscopy. Social History/Living Environment:  
Home Environment: Private residence # Steps to Enter: 4 Rails to Enter: Yes Hand Rails : Bilateral 
One/Two Story Residence: One story Living Alone: No 
Support Systems: Spouse/Significant Other/Partner Patient Expects to be Discharged to[de-identified] Private residence Current DME Used/Available at Home: Walker, rolling, Cane, straight Tub or Shower Type: Tub/Shower combination Prior Level of Function/Work/Activity: She lives with spouse in a single story home and typically ambulates independently, however reports when she first gets up and at night when she's tired she has to use a rollator walker to get around. Admits to frequent falls (about 3 per month). She drives and performs ADLs without assistance. Number of Personal Factors/Comorbidities that affect the Plan of Care: 1-2: MODERATE COMPLEXITY EXAMINATION:  
Most Recent Physical Functioning:  
Gross Assessment: 
AROM: Within functional limits PROM: Within functional limits Strength: Generally decreased, functional 
Coordination: Within functional limits Tone: Normal 
Sensation: Intact Posture: 
Posture (WDL): Exceptions to [facility] Posture Assessment: Forward head Balance: 
Sitting: Intact Standing: Impaired Standing - Static: Good Standing - Dynamic : Fair Bed Mobility: 
Rolling: Stand-by assistance Supine to Sit: Stand-by assistance Sit to Supine: Contact guard assistance Scooting: Contact guard assistance Interventions: Safety awareness training;Verbal cues Wheelchair Mobility: 
  
Transfers: 
Sit to Stand: Contact guard assistance Stand to Sit: Contact guard assistance Bed to Chair: Contact guard assistance Interventions: Safety awareness training;Verbal cues; Visual cues Gait: 
  
Base of Support: Center of gravity altered; Widened Speed/Esme: Slow Gait Abnormalities: Decreased step clearance; Path deviations Distance (ft): 500 Feet (ft) Assistive Device: Walker, rolling Interventions: Manual cues; Safety awareness training;Verbal cues Body Structures Involved: 1. Nerves 2. Bones 3. Joints 4. Muscles 5. Ligaments Body Functions Affected: 1. Sensory/Pain 2. Neuromusculoskeletal 
3. Movement Related Activities and Participation Affected: 1. Mobility 2. Self Care 3. Domestic Life 4. Interpersonal Interactions and Relationships 5. Community, Social and [place] Number of elements that affect the Plan of Care: 4+: HIGH COMPLEXITY CLINICAL PRESENTATION:  
Presentation: Stable and uncomplicated: LOW COMPLEXITY CLINICAL DECISION MAKING:  
[facility] [place] 6 Clicks Basic Mobility Inpatient Short Form How much difficulty does the patient currently have. .. Unable A Lot A Little None 1. Turning over in bed (including adjusting bedclothes, sheets and blankets)? ? 1   ? 2   x3   ? 4  
2. Sitting down on and standing up from a chair with arms ( e.g., wheelchair, bedside commode, etc.)   ? 1   ? 2  x 3   ? 4  
3. Moving from lying on back to sitting on the side of the bed?   ? 1   ? 2   x3   ? 4 How much help from another person does the patient currently need. .. Total A Lot A Little None 4. Moving to and from a bed to a chair (including a wheelchair)? ? 1   ? 2   x 3   ? 4  
5. Need to walk in hospital room? ? 1   ? 2   x 3   ? 4  
6. Climbing 3-5 steps with a railing? ? 1   ? 2   x 3   ? 4  
© 2007, Trustees of [facility], under license to [employer]. All rights reserved Score:  Initial: 18 Most Recent: X (Date: -- ) Interpretation of Tool:  Represents activities that are increasingly more difficult (i.e. Bed mobility, Transfers, Gait). Medical Necessity:    
· Patient demonstrates excellent rehab potential due to higher previous functional level. Reason for Services/Other Comments: 
· Patient continues to require modification of therapeutic interventions to increase complexity of exercises. Use of outcome tool(s) and clinical judgement create a POC that gives a: Clear prediction of patient's progress: LOW COMPLEXITY  
  
 
 
 
TREATMENT:  
(In addition to Assessment/Re-Assessment sessions the following treatments were rendered) Pre-treatment Symptoms/Complaints:  none Pain: Initial:  
Pain Intensity 1: 5  Post Session:  3 Therapeutic Activity: (    23 minutes): Therapeutic activities including bed mobility, Chair transfers, Ambulation on level ground and standing balance activities to improve mobility, strength and balance. Required minimal Manual cues; Safety awareness training;Verbal cues to promote static and dynamic balance in standing. Braces/Orthotics/Lines/Etc:  
· O2 Room Air Treatment/Session Assessment:   
· Response to Treatment:  Patient ambulated 500' with RW and SBA. · Interdisciplinary Collaboration:  
o Physical Therapist 
o Registered Nurse · After treatment position/precautions:  
o Supine in bed 
o Bed/Chair-wheels locked 
o Bed in low position 
o Call light within reach 
o RN notified 
o Family at bedside · Compliance with Program/Exercises: Will assess as treatment progresses · Recommendations/Intent for next treatment session: \"Next visit will focus on advancements to more challenging activities and reduction in assistance provided\". Total Treatment Duration: PT Patient Time In/Time Out Time In: 1340 Time Out: 1405 Miriam Ron DPT

## 2019-03-28 NOTE — PROGRESS NOTES
This RN notified that pt pulled out drain upon getting up to use BR. Dsg saturated and teg peeling up. New dsg applied, 4x4 and teg.  Will notify MD.

## 2019-03-28 NOTE — PROGRESS NOTES
Problem: Mobility Impaired (Adult and Pediatric) Goal: *Acute Goals and Plan of Care (Insert Text) Description STG: 
(1.)Ms. Bere Strickland will move from supine to sit and sit to supine , scoot up and down and roll side to side with SUPERVISION within 3 treatment day(s). (2.)Ms. Bere Strickland will transfer from bed to chair and chair to bed with SUPERVISION using the least restrictive device within 3 treatment day(s). (3.)Ms. Bere Strickland will ambulate with SUPERVISION for 250 feet with the least restrictive device within 3 treatment day(s). (4.)Ms. Bere Strickland will perform standing static and dynamic balance activities x 151 minutes with SUPERVISION to improve safety within 3 day(s). (5.)Ms. Bere Strickland will maintain spinal precautions throughout all functional mobility within 3 days with 0 verbal cues. LTG: 
(1.)Ms. Bere Strickland will move from supine to sit and sit to supine , scoot up and down and roll side to side in bed with MODIFIED INDEPENDENCE within 7 treatment day(s). (2.)Ms. Bere Strickland will transfer from bed to chair and chair to bed with MODIFIED INDEPENDENCE using the least restrictive device within 7 treatment day(s). (3.)Ms. Bere Strickland will ambulate with MODIFIED INDEPENDENCE for 500 feet with the least restrictive device within 7 treatment day(s). (4.)Ms. Bere Strickland will perform standing static and dynamic balance activities x 15 minutes with MODIFIED INDEPENDENCE to improve safety within 7 day(s). (5.)Ms. Bere Strickland will ascend and descend 4 stairs using B hand rail(s) with MODIFIED INDEPENDENCE to improve functional mobility and safety within 7 day(s). ________________________________________________________________________________________________ 
  
3/28/2019 1108 by Radha Mchugh DPT Outcome: Progressing Towards Goal 
 
PHYSICAL THERAPY: Initial Assessment, Daily Note and AM 3/28/2019 INPATIENT: PT Visit Days : 1 Payor: DENNY JONES / Plan: [place] BLUE CROSS [facility] / Product Type: PPO /   
  
 NAME/AGE/GENDER: Anusha Lee is a 47 y.o. female PRIMARY DIAGNOSIS: Lumbar stenosis with neurogenic claudication [M48.062] Spondylolisthesis, unspecified spinal region [M43.10] Other idiopathic scoliosis, unspecified spinal region [M41.20] Lumbar stenosis with neurogenic claudication [M48.062] Lumbar stenosis with neurogenic claudication Lumbar stenosis with neurogenic claudication Procedure(s) (LRB): 
L3 4 DLIF SSEP (N/A) POSTERIOR REVISION LAMINECTOMY L3 L4 and posterior fusion L3-4 BONE MARROW ASPIRATE, ALLOGRAFT, AND INSTRUMENTATION L3-S1 (N/A) 1 Day Post-Op ICD-10: Treatment Diagnosis: · Difficulty in walking, Not elsewhere classified (R26.2) Precaution/Allergies: 
Patient has no known allergies. ASSESSMENT:  
Ms. Juancarlos Donis is a 47 y.o. Female admitted s/p above surgery. She lives with spouse in a single story home and typically ambulates independently, however reports when she first gets up and at night when she's tired she has to use a rollator walker to get around. Admits to frequent falls (about 3 per month). She drives and performs ADLs without assistance. Supine on contact and agreeable to physical therapy evaluation and treatment. She was educated on spinal precautions and log roll technique, able to perform with CGA. Reports her RLE feels as though it \"shaw,\" at times, however today LLE with increased pain. 4-/5 strength in BLE and intact sensation L2-S1. She stood with CGA and ambulated within room with CGA to minimal assist. Treatment initiated to include ambulation in hallway with rolling walker, no losses of balance and requiring cues for walker negotiation within hallway as well as posture. Treatment continued upon return to room with sit to stand transfers from various surface heights, standing balance activities.  Anusha Lee is currently functioning below her baseline and would benefit from skilled PT during acute care stay to maximize safety and independence with functional mobility. This section established at most recent assessment PROBLEM LIST (Impairments causing functional limitations): 1. Decreased Strength 2. Decreased ADL/Functional Activities 3. Decreased Transfer Abilities 4. Decreased Ambulation Ability/Technique 5. Decreased Balance 6. Increased Pain 7. Decreased Knowledge of Precautions 8. Decreased [place] with Home Exercise Program 
 INTERVENTIONS PLANNED: (Benefits and precautions of physical therapy have been discussed with the patient.) 1. Balance Exercise 2. Bed Mobility 3. Family Education 4. Gait Training 5. Home Exercise Program (HEP) 6. Therapeutic Activites 7. Therapeutic Exercise/Strengthening 8. Transfer Training TREATMENT PLAN: Frequency/Duration: twice daily for duration of hospital stay Rehabilitation Potential For Stated Goals: Excellent RECOMMENDED REHABILITATION/EQUIPMENT: (at time of discharge pending progress): Due to the probability of continued deficits (see above) this patient will likely need continued skilled physical therapy after discharge. Equipment:  
? None at this time HISTORY:  
History of Present Injury/Illness (Reason for Referral): This is a very pleasant 51year old patient who presents with a several year history of low back pain with constant and progressive radiation to the buttocks and lower extremities, primarily on the right side. The onset of the symptoms was rather insidious. The patient describes the quality of the pain as a deep ache. The patient has noticed a progressive decrease in her ability to walk or stand for any extended period of time. Her standing tolerance is about 35 minutes and walking distance limited to 1 block. She does require a cane for ambulation. Her walking and standing pain is usually relieved with sitting.  She has noted that pushing a cart in the store seems to help. She denies any change in bowel or bladder function since the onset of the symptoms. The patient was originally seen here back in 2013 when she was felt to have discogenic back pain. I recommended against surgery. However, she saw Dr. Chidi Toney who ended up doing an L4-S1 laminectomy and fusion. Apparently, she did not do well with that surgery. Ultimately, she was found to have significant adjacent level breakdown and in 2017 she underwent a revision laminectomy at L3-L4 cephalad to her fusion. The patient states that that helped her symptoms very transiently. However, now they are worse than ever. It interferes with just about all activities of daily living. She cannot sit up straight or stand up straight because of the right-sided flank, buttock, and thigh and groin pain. She has had a workup of the right hip by Dr. Patricia Cabello including a hip MRI that failed to show significant pathology. It was therefore suggested that her problems were stemming from her low back and a lumbar MRI confirmed that. Past Medical History/Comorbidities: Ms. Bere Strickland  has a past medical history of Ambulates with cane, Arthritis, Chronic pain, Depression, Gait instability, Hypertension, and Thyroid disease. She also has no past medical history of COPD, Other ill-defined conditions(799.89), or Unspecified adverse effect of anesthesia. Ms. Bere Strickland  has a past surgical history that includes hx hysterectomy (1998); hx salpingo-oophorectomy (2018); hx cervical fusion (2007); hx lumbar fusion (2016); hx urological (2007); and hx colonoscopy. Social History/Living Environment:  
Home Environment: Private residence # Steps to Enter: 4 Rails to Enter: Yes Hand Rails : Bilateral 
One/Two Story Residence: One story Living Alone: No 
Support Systems: Spouse/Significant Other/Partner Patient Expects to be Discharged to[de-identified] Private residence Current DME Used/Available at Home: Walker, rolling, Cane, straight Prior Level of Function/Work/Activity: She lives with spouse in a single story home and typically ambulates independently, however reports when she first gets up and at night when she's tired she has to use a rollator walker to get around. Admits to frequent falls (about 3 per month). She drives and performs ADLs without assistance. Number of Personal Factors/Comorbidities that affect the Plan of Care: 1-2: MODERATE COMPLEXITY EXAMINATION:  
Most Recent Physical Functioning:  
Gross Assessment: 
AROM: Within functional limits PROM: Within functional limits Strength: Generally decreased, functional 
Coordination: Within functional limits Tone: Normal 
Sensation: Intact Posture: 
Posture (WDL): Exceptions to [facility] Posture Assessment: Forward head Balance: 
Sitting: Intact Standing: Impaired Standing - Static: Good Standing - Dynamic : Fair Bed Mobility: 
Rolling: Stand-by assistance Supine to Sit: Contact guard assistance Scooting: Contact guard assistance Wheelchair Mobility: 
  
Transfers: 
Sit to Stand: Contact guard assistance Stand to Sit: Contact guard assistance Bed to Chair: Contact guard assistance Gait: 
  
Base of Support: Center of gravity altered; Widened Speed/Esme: Slow Gait Abnormalities: Decreased step clearance; Path deviations;Trunk sway increased Distance (ft): 250 Feet (ft) Assistive Device: Walker, rolling Interventions: Manual cues; Safety awareness training;Verbal cues; Visual/Demos Body Structures Involved: 1. Nerves 2. Bones 3. Joints 4. Muscles 5. Ligaments Body Functions Affected: 1. Sensory/Pain 2. Neuromusculoskeletal 
3. Movement Related Activities and Participation Affected: 1. Mobility 2. Self Care 3. Domestic Life 4. Interpersonal Interactions and Relationships 5. Community, Social and [place] Axis Number of elements that affect the Plan of Care: 4+: HIGH COMPLEXITY CLINICAL PRESENTATION:  
Presentation: Stable and uncomplicated: LOW COMPLEXITY CLINICAL DECISION MAKING:  
[facility] AM-PAC 6 Clicks Basic Mobility Inpatient Short Form How much difficulty does the patient currently have. .. Unable A Lot A Little None 1. Turning over in bed (including adjusting bedclothes, sheets and blankets)? ? 1   ? 2   x3   ? 4  
2. Sitting down on and standing up from a chair with arms ( e.g., wheelchair, bedside commode, etc.)   ? 1   ? 2  x 3   ? 4  
3. Moving from lying on back to sitting on the side of the bed?   ? 1   ? 2   x3   ? 4 How much help from another person does the patient currently need. .. Total A Lot A Little None 4. Moving to and from a bed to a chair (including a wheelchair)? ? 1   ? 2   x 3   ? 4  
5. Need to walk in hospital room? ? 1   ? 2   x 3   ? 4  
6. Climbing 3-5 steps with a railing? ? 1   ? 2   x 3   ? 4  
© 2007, Trustees of [facility], under license to [employer]. All rights reserved Score:  Initial: 18 Most Recent: X (Date: -- ) Interpretation of Tool:  Represents activities that are increasingly more difficult (i.e. Bed mobility, Transfers, Gait). Medical Necessity:    
· Patient demonstrates excellent rehab potential due to higher previous functional level. Reason for Services/Other Comments: 
· Patient continues to require modification of therapeutic interventions to increase complexity of exercises. Use of outcome tool(s) and clinical judgement create a POC that gives a: Clear prediction of patient's progress: LOW COMPLEXITY  
  
 
 
 
TREATMENT:  
(In addition to Assessment/Re-Assessment sessions the following treatments were rendered) Pre-treatment Symptoms/Complaints:  none Pain: Initial:  
Pain Intensity 1: 3  Post Session:  3 Therapeutic Activity: (    24 minutes):   Therapeutic activities including Chair transfers, Ambulation on level ground and standing balance activities to improve mobility, strength and balance. Required minimal Manual cues; Safety awareness training;Verbal cues; Visual/Demos to promote static and dynamic balance in standing. Braces/Orthotics/Lines/Etc:  
· O2 Room Air Treatment/Session Assessment:   
· Response to Treatment:  Patient ambulated 250' with RW and CGA. · Interdisciplinary Collaboration:  
o Physical Therapist 
o Registered Nurse · After treatment position/precautions:  
o Up in chair 
o Bed/Chair-wheels locked 
o Bed in low position 
o Call light within reach 
o RN notified · Compliance with Program/Exercises: Will assess as treatment progresses · Recommendations/Intent for next treatment session: \"Next visit will focus on advancements to more challenging activities and reduction in assistance provided\". Total Treatment Duration: PT Patient Time In/Time Out Time In: 5428 Time Out: 2500 Papo Poon DPT

## 2019-03-28 NOTE — ANESTHESIA POSTPROCEDURE EVALUATION
Procedure(s): L3 4 DLIF SSEP POSTERIOR REVISION LAMINECTOMY L3 L4 and posterior fusion L3-4 BONE MARROW ASPIRATE, ALLOGRAFT, AND INSTRUMENTATION L3-S1. 
 
general 
 
Anesthesia Post Evaluation Multimodal analgesia: multimodal analgesia used between 6 hours prior to anesthesia start to PACU discharge Patient location during evaluation: bedside Patient participation: complete - patient participated Level of consciousness: responsive to verbal stimuli Pain management: adequate Airway patency: patent Anesthetic complications: no 
Cardiovascular status: hemodynamically stable Respiratory status: spontaneous ventilation Hydration status: stable Vitals Value Taken Time /71 3/27/2019  5:58 PM  
Temp 36.6 °C (97.8 °F) 3/27/2019  4:59 PM  
Pulse 92 3/27/2019  6:21 PM  
Resp 22 3/27/2019  6:21 PM  
SpO2 94 % 3/27/2019  6:21 PM  
Vitals shown include unvalidated device data.

## 2019-03-28 NOTE — PROGRESS NOTES
POD 1 Drain - 150cc 
 
AF,VSS Patient with expected back pain, legs okay, mattson out Neuro intact Dressing okay Improving, leave drain, continue PT

## 2019-03-28 NOTE — PROGRESS NOTES
Problem: Falls - Risk of 
Goal: *Absence of Falls Description Document Abimael Myrick Fall Risk and appropriate interventions in the flowsheet. Outcome: Progressing Towards Goal 
  
Problem: Patient Education: Go to Patient Education Activity Goal: Patient/Family Education Outcome: Progressing Towards Goal 
  
Problem: Patient Education: Go to Patient Education Activity Goal: Patient/Family Education Outcome: Progressing Towards Goal 
  
Problem: Complex Spine Procedure:  Day of Surgery Goal: Off Pathway (Use only if patient is Off Pathway) Outcome: Progressing Towards Goal 
Goal: Activity/Safety Outcome: Progressing Towards Goal 
Goal: Consults, if ordered Outcome: Progressing Towards Goal 
Goal: Diagnostic Test/Procedures Outcome: Progressing Towards Goal 
Goal: Nutrition/Diet Outcome: Progressing Towards Goal 
Goal: Discharge Planning Outcome: Progressing Towards Goal 
Goal: Medications Outcome: Progressing Towards Goal 
Goal: Respiratory Outcome: Progressing Towards Goal 
Goal: Treatments/Interventions/Procedures Outcome: Progressing Towards Goal 
Goal: Psychosocial 
Outcome: Progressing Towards Goal 
Goal: *Optimal pain control at patient's stated goal 
Outcome: Progressing Towards Goal 
Goal: *Demonstrates progressive activity Outcome: Progressing Towards Goal 
Goal: *Respiratory status stable Outcome: Progressing Towards Goal

## 2019-03-29 VITALS
DIASTOLIC BLOOD PRESSURE: 82 MMHG | SYSTOLIC BLOOD PRESSURE: 133 MMHG | OXYGEN SATURATION: 95 % | HEIGHT: 64 IN | TEMPERATURE: 98.7 F | BODY MASS INDEX: 27.14 KG/M2 | RESPIRATION RATE: 17 BRPM | WEIGHT: 159 LBS | HEART RATE: 96 BPM

## 2019-03-29 PROCEDURE — 74011250636 HC RX REV CODE- 250/636: Performed by: ORTHOPAEDIC SURGERY

## 2019-03-29 PROCEDURE — 74011250637 HC RX REV CODE- 250/637: Performed by: ORTHOPAEDIC SURGERY

## 2019-03-29 RX ADMIN — ACETAMINOPHEN 650 MG: 325 TABLET, FILM COATED ORAL at 05:34

## 2019-03-29 RX ADMIN — ONDANSETRON 4 MG: 2 INJECTION INTRAMUSCULAR; INTRAVENOUS at 01:32

## 2019-03-29 RX ADMIN — CITALOPRAM HYDROBROMIDE 20 MG: 20 TABLET ORAL at 09:39

## 2019-03-29 RX ADMIN — OXYBUTYNIN CHLORIDE 5 MG: 5 TABLET ORAL at 09:39

## 2019-03-29 RX ADMIN — ESTRADIOL 2 MG: 1 TABLET ORAL at 09:39

## 2019-03-29 RX ADMIN — Medication 10 ML: at 05:35

## 2019-03-29 RX ADMIN — OXYCODONE HYDROCHLORIDE 10 MG: 5 TABLET ORAL at 02:37

## 2019-03-29 RX ADMIN — DOCUSATE SODIUM 100 MG: 100 CAPSULE, LIQUID FILLED ORAL at 09:39

## 2019-03-29 RX ADMIN — LEVOTHYROXINE SODIUM 88 MCG: 88 TABLET ORAL at 05:34

## 2019-03-29 RX ADMIN — FAMOTIDINE 20 MG: 20 TABLET ORAL at 09:39

## 2019-03-29 RX ADMIN — GABAPENTIN 600 MG: 300 CAPSULE ORAL at 05:34

## 2019-03-29 RX ADMIN — LISINOPRIL 10 MG: 5 TABLET ORAL at 09:39

## 2019-03-29 NOTE — DISCHARGE SUMMARY
Discharge Summary    Patient ID:Samara Ibarra  [id number]  1970  47 y.o. Admit date: 3/27/2019    Discharge date:3/29/2019     SURGEON: Dr. Salima Suh DIAGNOSIS:      1. Recurrent lumbar stenosis in the area of a prior lumbar decompression  2. Scoliosis    POSTOPERATIVE DIAGNOSIS:      1. Recurrent lumbar stenosis in the area of a prior lumbar decompression  2. Scoliosis     PROCEDURE:     1. Minimally invasive L3 - L4 direct lateral arthrodesis  2. Revision lumbar laminectomy  L3 through L4  with bilateral foraminotomies. 3. Lumbar posterolateral fusion  L3 through L4 .  4. Pedicle screw instrumentation  L3 through S1 .  5. Bone marrow aspirate for allograft  6. Insertion biomechanical device L3 through L4   7. Local autograft   8. Exploration fusion L4-S1     ANESTHESIA: General.    ESTIMATED BLOOD LOSS: 275 ml    POSTOPERATIVE CONDITION: Stable. INTRAOPERATIVE COMPLICATIONS: None.         INDICATIONS FOR PROCEDURE: Back and leg pain consistent with claudication/lumbar radiculitis that is no longer responsive to conservative measures. Walking and standing tolerances have diminished. Imaging studies are concordant, showing lumbar stenosis in the area of a prior lumbar laminectomy. In the outpatient setting, the risks, benefits, and potential complications of the above listed procedure were discussed and an informed consent was obtained. HBG at Discharge: No results for input(s): HGB, HGBEXT in the last 72 hours. Hospital Course: Patient admitted to ortho floor. Antibiotics were given postop. SCD and carlota hose were in place for DVT prophylaxis. Hennessy catheter was in place until POD#1 then discontinued. Patient did not receive blood transfusion. The patient tolerated pain medications and po diet. The hemovac drain output gradually diminished and was then removed. Dressing remained clean, dry, and intact.   Physical Therapy started on the day following surgery and progressed to ambulation without assistance. At the time of discharge, the patient had understanding of precautions needed following surgery. Postoperative complications requiring extended length of stay: None    Discharged to: Home    New Medications: oxycodone    Discharge instructions:  -Resume pre hospital diet            -Resume home medications per medical continuation form     -Follow up in office as scheduled   -Call doctor immediately if T>100.5, increased pain, swelling, drainage.   -If shortness of breath or chest pain, immediately go to ER  -Post surgical instruction sheet given to patient    Signed:  GERALDINE Hudson  3/29/2019

## 2019-03-29 NOTE — PROGRESS NOTES
Chart screened by  for discharge planning. No needs identified at this time. Patient is discharging to home today. DC order written by GERALDINE Rodríguez at [phone]. Please consult  if any new issues arise. Care Management Interventions Mode of Transport at Discharge: Other (see comment)(family / friends) Transition of Care Consult (CM Consult): Discharge Planning Discharge Durable Medical Equipment: No 
Physical Therapy Consult: Yes Occupational Therapy Consult: Yes Speech Therapy Consult: No 
Discharge Location Discharge Placement: Home

## 2019-03-29 NOTE — PROGRESS NOTES
ORTH POST OP PROGRESS NOTE 2019 Admit Date: 3/27/2019 Admit Diagnosis: Lumbar stenosis with neurogenic claudication [M48.062] Spondylolisthesis, unspecified spinal region [M43.10] Other idiopathic scoliosis, unspecified spinal region [M41.20] Lumbar stenosis with neurogenic claudication [M48.062] Procedure: Procedure(s): L3 4 DLIF SSEP POSTERIOR REVISION LAMINECTOMY L3 L4 and posterior fusion L3-4 BONE MARROW ASPIRATE, ALLOGRAFT, AND INSTRUMENTATION L3-S1 Post Op day: 2 Days Post-Op Subjective:  
 
Camryn Torres is a patient who has improved nausea. +flatus. Objective:  
 
Vital Signs:   
Blood pressure 133/82, pulse 96, temperature 98.7 °F (37.1 °C), resp. rate 17, height 5' 4\" (1.626 m), weight 72.1 kg (159 lb), SpO2 95 %. Temp (24hrs), Av °F (37.2 °C), Min:98.1 °F (36.7 °C), Max:100.5 °F (38.1 °C) 
 
 
 07 -  1900 In: 120 [P.O.:120] Out: 100 [Urine:100]  190 -  0700 In: 480 [P.O.:480] Out: 8204 [Urine:2000; Drains:545] LAB:   
No results for input(s): HGB, WBC, PLT, HGBEXT, PLTEXT in the last 72 hours. Physical Exam 
 
General:   Alert and oriented. No acute distress Lungs:  Respirations unlabored. Extremities: No evidence of cyanosis. Calves soft, nontender. Moves both upper and lower extremities. Dressing:  clean, dry, and intact Neuro:  no deficit Assessment:  
  
Patient Active Problem List  
Diagnosis Code  Lumbar stenosis with neurogenic claudication M48.062 Plan:  
 
Continue PT Anticipate Discharge To: HOME Signed By: Katie Mcguire PA-C

## 2019-03-29 NOTE — DISCHARGE INSTRUCTIONS
MAY shower NO tub bathing    LEAVE dressing on incision for 3 DAYS-->then may remove   (If dressing starts to fall off may re-secure with tape)    NO lifting anything heavier than 5LBS     NO Bending, Lifting or Twisting    NO driving until directed by your doctor    Avoid sitting more than 20 - 30 minutes at a time    DO NOT take any NSAIDS (either prescribed or over the counter until directed    (Aleve, Ibuprofen, Mobic, etc) as this will interfere with bone healing    CALL the doctor if:  Fever >100.5  ([phone])                 Incision becomes red, swollen or  opens up               Incision has yellow, thick drainage or an odor               Pain is not managed with prescribed medications               Excessive nausea and/or vomiting    Avoid having pets sleep in bed with you until incision is completely healed        DISCHARGE SUMMARY from Nurse    PATIENT INSTRUCTIONS:    After general anesthesia or intravenous sedation, for 24 hours or while taking prescription Narcotics:  · Limit your activities  · Do not drive and operate hazardous machinery  · Do not make important personal or business decisions  · Do  not drink alcoholic beverages  · If you have not urinated within 8 hours after discharge, please contact your surgeon on call.     Report the following to your surgeon:  · Excessive pain, swelling, redness or odor of or around the surgical area  · Temperature over 100.5  · Nausea and vomiting lasting longer than 4 hours or if unable to take medications  · Any signs of decreased circulation or nerve impairment to extremity: change in color, persistent  numbness, tingling, coldness or increase pain  · Any questions    What to do at Home:  Recommended activity: Activity as tolerated, per MD/PT/OT instructions    If you experience any of the following symptoms fever > 100.5, nausea, vomiting, pain, chest pain and/or shortness of breath to ER please follow up with MD.    *  Please give a list of your current medications to your Primary Care Provider. *  Please update this list whenever your medications are discontinued, doses are      changed, or new medications (including over-the-counter products) are added. *  Please carry medication information at all times in case of emergency situations. These are general instructions for a healthy lifestyle:    No smoking/ No tobacco products/ Avoid exposure to second hand smoke  Surgeon General's Warning:  Quitting smoking now greatly reduces serious risk to your health. Obesity, smoking, and sedentary lifestyle greatly increases your risk for illness    A healthy diet, regular physical exercise & weight monitoring are important for maintaining a healthy lifestyle    You may be retaining fluid if you have a history of heart failure or if you experience any of the following symptoms:  Weight gain of 3 pounds or more overnight or 5 pounds in a week, increased swelling in our hands or feet or shortness of breath while lying flat in bed. Please call your doctor as soon as you notice any of these symptoms; do not wait until your next office visit. Recognize signs and symptoms of STROKE:    F-face looks uneven    A-arms unable to move or move unevenly    S-speech slurred or non-existent    T-time-call 911 as soon as signs and symptoms begin-DO NOT go       Back to bed or wait to see if you get better-TIME IS BRAIN. Warning Signs of HEART ATTACK     Call 911 if you have these symptoms:   Chest discomfort. Most heart attacks involve discomfort in the center of the chest that lasts more than a few minutes, or that goes away and comes back. It can feel like uncomfortable pressure, squeezing, fullness, or pain.  Discomfort in other areas of the upper body. Symptoms can include pain or discomfort in one or both arms, the back, neck, jaw, or stomach.  Shortness of breath with or without chest discomfort.    Other signs may include breaking out in a cold sweat, nausea, or lightheadedness. Don't wait more than five minutes to call 911 - MINUTES MATTER! Fast action can save your life. Calling 911 is almost always the fastest way to get lifesaving treatment. Emergency Medical Services staff can begin treatment when they arrive -- up to an hour sooner than if someone gets to the hospital by car. The discharge information has been reviewed with the patient. The patient verbalized understanding. Discharge medications reviewed with the patient and appropriate educational materials and side effects teaching were provided.   ___________________________________________________________________________________________________________________________________

## 2019-04-03 ENCOUNTER — HOME HEALTH ADMISSION (OUTPATIENT)
Dept: HOME HEALTH SERVICES | Facility: HOME HEALTH | Age: 49
End: 2019-04-03
Payer: COMMERCIAL

## 2019-04-04 ENCOUNTER — HOME CARE VISIT (OUTPATIENT)
Dept: SCHEDULING | Facility: HOME HEALTH | Age: 49
End: 2019-04-04
Payer: COMMERCIAL

## 2019-04-04 VITALS
TEMPERATURE: 98.7 F | RESPIRATION RATE: 20 BRPM | HEART RATE: 96 BPM | SYSTOLIC BLOOD PRESSURE: 118 MMHG | DIASTOLIC BLOOD PRESSURE: 90 MMHG

## 2019-04-04 PROCEDURE — G0151 HHCP-SERV OF PT,EA 15 MIN: HCPCS

## 2019-04-04 PROCEDURE — 400013 HH SOC

## 2019-04-08 ENCOUNTER — HOME CARE VISIT (OUTPATIENT)
Dept: SCHEDULING | Facility: HOME HEALTH | Age: 49
End: 2019-04-08
Payer: COMMERCIAL

## 2019-04-08 VITALS
HEART RATE: 92 BPM | SYSTOLIC BLOOD PRESSURE: 110 MMHG | TEMPERATURE: 96.4 F | RESPIRATION RATE: 18 BRPM | DIASTOLIC BLOOD PRESSURE: 78 MMHG

## 2019-04-08 PROCEDURE — G0151 HHCP-SERV OF PT,EA 15 MIN: HCPCS

## 2019-04-10 ENCOUNTER — HOME CARE VISIT (OUTPATIENT)
Dept: SCHEDULING | Facility: HOME HEALTH | Age: 49
End: 2019-04-10
Payer: COMMERCIAL

## 2019-04-10 VITALS
SYSTOLIC BLOOD PRESSURE: 110 MMHG | TEMPERATURE: 97.6 F | HEART RATE: 97 BPM | RESPIRATION RATE: 16 BRPM | DIASTOLIC BLOOD PRESSURE: 60 MMHG

## 2019-04-10 PROCEDURE — G0157 HHC PT ASSISTANT EA 15: HCPCS

## 2019-04-17 ENCOUNTER — HOME CARE VISIT (OUTPATIENT)
Dept: SCHEDULING | Facility: HOME HEALTH | Age: 49
End: 2019-04-17
Payer: COMMERCIAL

## 2019-04-17 PROCEDURE — G0151 HHCP-SERV OF PT,EA 15 MIN: HCPCS

## 2020-10-23 ENCOUNTER — HOSPITAL ENCOUNTER (OUTPATIENT)
Dept: GENERAL RADIOLOGY | Age: 50
Discharge: HOME | End: 2020-10-23
Payer: COMMERCIAL

## 2020-10-23 DIAGNOSIS — M25.50 POLYARTHRALGIA: ICD-10-CM

## 2020-10-23 PROCEDURE — 73600 X-RAY EXAM OF ANKLE: CPT

## 2020-10-23 PROCEDURE — 73120 X-RAY EXAM OF HAND: CPT

## 2020-10-23 PROCEDURE — 73620 X-RAY EXAM OF FOOT: CPT

## 2022-03-19 PROBLEM — M48.062 LUMBAR STENOSIS WITH NEUROGENIC CLAUDICATION: Status: ACTIVE | Noted: 2019-03-27

## 2024-11-11 ENCOUNTER — TELEPHONE (OUTPATIENT)
Dept: ORTHOPEDIC SURGERY | Age: 54
End: 2024-11-11

## 2024-11-11 NOTE — TELEPHONE ENCOUNTER
[facility] is referring to [facility]  [facility] dis sx 2019  Please review media referral and notes.  Does [facility] want to see back? Or who should see?  Thank you

## 2024-11-15 ENCOUNTER — OFFICE VISIT (OUTPATIENT)
Age: 54
End: 2024-11-15

## 2024-11-15 VITALS — WEIGHT: 146 LBS | HEIGHT: 65 IN | BODY MASS INDEX: 24.32 KG/M2

## 2024-11-15 DIAGNOSIS — Z98.1 HISTORY OF LUMBAR FUSION: Primary | ICD-10-CM

## 2024-11-15 DIAGNOSIS — M54.50 LUMBAR BACK PAIN: ICD-10-CM

## 2024-11-15 RX ORDER — GABAPENTIN 300 MG/1
CAPSULE ORAL
COMMUNITY
Start: 2024-11-12

## 2024-11-15 RX ORDER — MIDODRINE HYDROCHLORIDE 5 MG/1
5 TABLET ORAL 2 TIMES DAILY
COMMUNITY
Start: 2024-11-05

## 2024-11-15 RX ORDER — VALACYCLOVIR HYDROCHLORIDE 500 MG/1
500 TABLET, FILM COATED ORAL DAILY PRN
COMMUNITY

## 2024-11-15 RX ORDER — SOLIFENACIN SUCCINATE 5 MG/1
5 TABLET, FILM COATED ORAL DAILY
COMMUNITY
Start: 2024-11-08

## 2024-11-15 RX ORDER — EZETIMIBE 10 MG/1
10 TABLET ORAL DAILY
COMMUNITY

## 2024-11-15 RX ORDER — PANTOPRAZOLE SODIUM 40 MG/1
40 TABLET, DELAYED RELEASE ORAL DAILY
COMMUNITY
Start: 2024-08-20 | End: 2025-08-20

## 2024-11-15 RX ORDER — CELECOXIB 200 MG/1
200 CAPSULE ORAL DAILY
COMMUNITY

## 2024-11-15 RX ORDER — LEVOTHYROXINE SODIUM 100 UG/1
100 TABLET ORAL DAILY
COMMUNITY
Start: 2024-09-11

## 2024-11-15 RX ORDER — AMPICILLIN TRIHYDRATE 250 MG
1000 CAPSULE ORAL 2 TIMES DAILY
COMMUNITY

## 2024-11-15 RX ORDER — TEMAZEPAM 30 MG/1
CAPSULE ORAL
COMMUNITY
Start: 2024-10-31

## 2024-11-15 RX ORDER — BUPRENORPHINE 15 UG/H
1 PATCH TRANSDERMAL WEEKLY
COMMUNITY

## 2024-11-15 RX ORDER — PRAVASTATIN SODIUM 40 MG
40 TABLET ORAL NIGHTLY
COMMUNITY

## 2024-11-15 RX ORDER — HYDROCHLOROTHIAZIDE 12.5 MG/1
12.5 TABLET ORAL EVERY MORNING
COMMUNITY

## 2024-11-15 RX ORDER — CITALOPRAM HYDROBROMIDE 40 MG/1
40 TABLET ORAL DAILY
COMMUNITY
Start: 2024-09-11

## 2024-11-15 RX ORDER — FEXOFENADINE HCL 180 MG/1
180 TABLET ORAL DAILY
COMMUNITY

## 2024-11-15 RX ORDER — DULOXETIN HYDROCHLORIDE 30 MG/1
30 CAPSULE, DELAYED RELEASE ORAL
COMMUNITY
Start: 2024-08-01

## 2024-11-15 RX ORDER — BUPRENORPHINE HYDROCHLORIDE 450 UG/1
450 FILM, SOLUBLE BUCCAL 2 TIMES DAILY
COMMUNITY

## 2024-11-15 RX ORDER — CHOLECALCIFEROL (VITAMIN D3) 50 MCG
2000 TABLET ORAL EVERY MORNING
COMMUNITY

## 2024-11-15 RX ORDER — PLECANATIDE 3 MG/1
3 TABLET ORAL DAILY
COMMUNITY
Start: 2024-08-13

## 2024-11-15 NOTE — PROGRESS NOTES
by mouth 2 times daily Yes Saima Gloria MD   pantoprazole (PROTONIX) 40 MG tablet Take 1 tablet by mouth daily Yes Saima Gloria MD   Plecanatide (TRULANCE) 3 MG TABS Take 3 mg by mouth daily Yes Saima Gloria MD   pravastatin (PRAVACHOL) 40 MG tablet Take 1 tablet by mouth nightly Yes Saima Gloria MD   solifenacin (VESICARE) 5 MG tablet Take 1 tablet by mouth daily Yes Saima Gloria MD   TURMERIC PO Take 1,000 mg by mouth every morning Yes Saima Gloria MD   valACYclovir (VALTREX) 500 MG tablet Take 1 tablet by mouth daily as needed Yes Saima Gloria MD   citalopram (CELEXA) 40 MG tablet Take 1 tablet by mouth daily Yes Saima Gloria MD   gabapentin (NEURONTIN) 300 MG capsule TAKE 1 CAPSULE BY MOUTH IN THE MORNING AND 1 CAPSULE AT LUNCH, AND 2 CAPSULES NIGHTLY. Yes Saima Gloria MD   levothyroxine (SYNTHROID) 100 MCG tablet Take 1 tablet by mouth daily Yes Saima Gloria MD   temazepam (RESTORIL) 30 MG capsule TAKE 1 CAPSULE BY MOUTH ONCE DAILY AT BEDTIME AS NEEDED Yes Saima Gloria MD   citalopram (CELEXA) 20 MG tablet Take 1 tablet by mouth 2 times daily Yes Automatic Reconciliation, Ar   estradiol (ESTRACE) 2 MG tablet Take 1 tablet by mouth daily Yes Automatic Reconciliation, Ar   gabapentin (NEURONTIN) 600 MG tablet Take 2 tablets by mouth 3 times daily. Yes Automatic Reconciliation, Ar   levothyroxine (SYNTHROID) 88 MCG tablet Take 1 tablet by mouth Yes Automatic Reconciliation, Ar   lisinopril (PRINIVIL;ZESTRIL) 10 MG tablet Take 1 tablet by mouth daily Yes Automatic Reconciliation, Ar   cyclobenzaprine (FLEXERIL) 10 MG tablet Take 10 mg by mouth every 6 hours as needed  Patient not taking: Reported on 11/15/2024  Automatic Reconciliation, Ar   oxybutynin (DITROPAN) 5 MG tablet Take 5 mg by mouth 3 times daily  Patient not taking: Reported on 11/15/2024  Automatic Reconciliation, Ar   oxyCODONE-acetaminophen (PERCOCET)

## (undated) DEVICE — MASTISOL ADHESIVE LIQ 2/3ML

## (undated) DEVICE — JELLY LUBRICATING 10GM PREFIL SYR LUBE

## (undated) DEVICE — SUTURE VCRL SZ 2-0 L27IN ABSRB UD L36MM CP-1 1/2 CIR REV J266H

## (undated) DEVICE — TRAY CATH 16F URIN MTR LTX -- CONVERT TO ITEM 363111

## (undated) DEVICE — DRAPE TWL SURG 16X26IN BLU ORB04] ALLCARE INC]

## (undated) DEVICE — AMD ANTIMICROBIAL GAUZE SPONGES,12 PLY USP TYPE VII, 0.2% POLYHEXAMETHYLENE BIGUANIDE HCI (PHMB): Brand: CURITY

## (undated) DEVICE — BIPOLAR FORCEPS CORD: Brand: VALLEYLAB

## (undated) DEVICE — JAM SHIDI: Brand: XIA PRECISION SYSTEM

## (undated) DEVICE — SUTURE VCRL SZ 1 L27IN ABSRB UD L36MM CP-1 1/2 CIR REV CUT J268H

## (undated) DEVICE — X-RAY SPONGES,12 PLY: Brand: DERMACEA

## (undated) DEVICE — DRAIN KT WND 10FR RND 400ML --

## (undated) DEVICE — 1010 S-DRAPE TOWEL DRAPE 10/BX: Brand: STERI-DRAPE™

## (undated) DEVICE — 2.1MM X 19.6MM METAL CUTTING HELIOCOIDAL RASP

## (undated) DEVICE — 3M™ TEGADERM™ TRANSPARENT FILM DRESSING FRAME STYLE, 1628, 6 IN X 8 IN (15 CM X 20 CM), 10/CT 8CT/CASE: Brand: 3M™ TEGADERM™

## (undated) DEVICE — 20 ML SYRINGE LUER-LOCK TIP: Brand: MONOJECT

## (undated) DEVICE — SUTURE VCRL + 3-0 L27IN ABSRB UD PS-2 L19MM 3/8 CIR PRIM VCP427H

## (undated) DEVICE — (D)PREP SKN CHLRAPRP APPL 26ML -- CONVERT TO ITEM 371833

## (undated) DEVICE — CARDINAL HEALTH FLEXIBLE LIGHT HANDLE COVER: Brand: CARDINAL HEALTH

## (undated) DEVICE — 3M™ STERI-STRIP™ REINFORCED ADHESIVE SKIN CLOSURES, R1548, 1 IN X 5 IN (25 MM X 125 MM), 4 STRIPS/ENVELOPE: Brand: 3M™ STERI-STRIP™

## (undated) DEVICE — KIT POS W/ FOAM ARM CRADL SHEARGUARD CHST PD CVR FOR SPNL

## (undated) DEVICE — 3M™ TEGADERM™ TRANSPARENT FILM DRESSING FRAME STYLE, 1626W, 4 IN X 4-3/4 IN (10 CM X 12 CM), 50/CT 4CT/CASE: Brand: 3M™ TEGADERM™

## (undated) DEVICE — Device

## (undated) DEVICE — SOLUTION IV 1000ML 0.9% SOD CHL

## (undated) DEVICE — WAX SURG 2.5GM HEMSTAT BNE BEESWAX PARAFFIN ISO PALMITATE

## (undated) DEVICE — BIPOLAR SEALER 23-112-1 AQM 6.0: Brand: AQUAMANTYS ®

## (undated) DEVICE — DRAPE XR C ARM 41X74IN LF --

## (undated) DEVICE — POLYAXIAL SCREW
Type: IMPLANTABLE DEVICE | Site: SPINE LUMBAR | Status: NON-FUNCTIONAL
Brand: XIA 3 SYSTEM - SERRATO
Removed: 2019-03-27

## (undated) DEVICE — 5.0MM PRECISION ROUND

## (undated) DEVICE — 2000CC GUARDIAN II: Brand: GUARDIAN

## (undated) DEVICE — REM POLYHESIVE ADULT PATIENT RETURN ELECTRODE: Brand: VALLEYLAB

## (undated) DEVICE — MEDI-VAC NON-CONDUCTIVE SUCTION TUBING: Brand: CARDINAL HEALTH

## (undated) DEVICE — 3M™ STERI-DRAPE™ INSTRUMENT POUCH 1018: Brand: STERI-DRAPE™

## (undated) DEVICE — PACK PROCEDURE SURG POST LAMINECTOMY CDS

## (undated) DEVICE — FLOSEAL HEMOSTATIC MATRIX, 10 ML: Brand: FLOSEAL

## (undated) DEVICE — KENDALL SCD EXPRESS SLEEVES, KNEE LENGTH, MEDIUM: Brand: KENDALL SCD

## (undated) DEVICE — PENCIL ES L12IN BAYNT MPLR DISP BOVIE

## (undated) DEVICE — 3M™ IOBAN™ 2 ANTIMICROBIAL INCISE DRAPE 6648EZ: Brand: IOBAN™ 2

## (undated) DEVICE — UNIVERSAL DRAPES: Brand: MEDLINE INDUSTRIES, INC.

## (undated) DEVICE — DRAPE SHT 3 QTR PROXIMA 53X77 --